# Patient Record
Sex: FEMALE | Race: WHITE | Employment: OTHER | URBAN - METROPOLITAN AREA
[De-identification: names, ages, dates, MRNs, and addresses within clinical notes are randomized per-mention and may not be internally consistent; named-entity substitution may affect disease eponyms.]

---

## 2017-04-28 ENCOUNTER — HOSPITAL ENCOUNTER (OUTPATIENT)
Dept: ULTRASOUND IMAGING | Age: 72
Discharge: HOME | End: 2017-04-28
Attending: SPECIALIST
Payer: MEDICARE

## 2017-04-28 DIAGNOSIS — R22.1 NECK MASS: ICD-10-CM

## 2017-04-28 PROCEDURE — 76536 US EXAM OF HEAD AND NECK: CPT

## 2024-01-08 ENCOUNTER — OFFICE VISIT (OUTPATIENT)
Facility: CLINIC | Age: 79
End: 2024-01-08
Payer: MEDICARE

## 2024-01-08 VITALS
HEIGHT: 66 IN | TEMPERATURE: 98 F | BODY MASS INDEX: 26.36 KG/M2 | HEART RATE: 65 BPM | RESPIRATION RATE: 20 BRPM | OXYGEN SATURATION: 98 % | WEIGHT: 164 LBS | SYSTOLIC BLOOD PRESSURE: 149 MMHG | DIASTOLIC BLOOD PRESSURE: 90 MMHG

## 2024-01-08 DIAGNOSIS — Z13.220 LIPID SCREENING: ICD-10-CM

## 2024-01-08 DIAGNOSIS — R35.0 URINARY FREQUENCY: ICD-10-CM

## 2024-01-08 DIAGNOSIS — Z13.1 DIABETES MELLITUS SCREENING: ICD-10-CM

## 2024-01-08 DIAGNOSIS — I10 PRIMARY HYPERTENSION: ICD-10-CM

## 2024-01-08 DIAGNOSIS — K21.9 GASTROESOPHAGEAL REFLUX DISEASE WITHOUT ESOPHAGITIS: ICD-10-CM

## 2024-01-08 DIAGNOSIS — R79.89 ELEVATED SERUM CREATININE: Primary | ICD-10-CM

## 2024-01-08 DIAGNOSIS — F51.04 PSYCHOPHYSIOLOGICAL INSOMNIA: ICD-10-CM

## 2024-01-08 PROCEDURE — 1123F ACP DISCUSS/DSCN MKR DOCD: CPT | Performed by: FAMILY MEDICINE

## 2024-01-08 PROCEDURE — 3080F DIAST BP >= 90 MM HG: CPT | Performed by: FAMILY MEDICINE

## 2024-01-08 PROCEDURE — 99204 OFFICE O/P NEW MOD 45 MIN: CPT | Performed by: FAMILY MEDICINE

## 2024-01-08 PROCEDURE — 3077F SYST BP >= 140 MM HG: CPT | Performed by: FAMILY MEDICINE

## 2024-01-08 RX ORDER — OMEPRAZOLE 20 MG/1
20 CAPSULE, DELAYED RELEASE ORAL DAILY
COMMUNITY
Start: 2023-10-31

## 2024-01-08 RX ORDER — MAGNESIUM OXIDE TAB 400 MG (241.3 MG ELEMENTAL MG) 400 (241.3 MG) MG
800 TAB ORAL DAILY
COMMUNITY
Start: 2024-01-01

## 2024-01-08 RX ORDER — ALPRAZOLAM 0.5 MG/1
0.25 TABLET ORAL NIGHTLY PRN
COMMUNITY
Start: 2023-12-12

## 2024-01-08 RX ORDER — METOPROLOL SUCCINATE 50 MG/1
50 TABLET, EXTENDED RELEASE ORAL DAILY
COMMUNITY
Start: 2023-12-15

## 2024-01-08 NOTE — PROGRESS NOTES
Family Medicine Initial Office Visit  Patient: Isabel Foote  1945, 78 y.o., female  Encounter Date: 1/8/2024    ASSESSMENT & PLAN  Isabel Foote is a 78 y.o. female who presented for established care with below concerns:    1. Elevated serum creatinine  Assessment & Plan:   Unclear control, continue current plan pending work up below. Told to be improving, continue hydration.  Orders:  -     CBC; Future  -     Comprehensive Metabolic Panel; Future  -     Urinalysis with Reflex to Culture; Future  -     Magnesium; Future  2. Urinary frequency  Assessment & Plan:  Ddx: overactive bladder/postmenopausal, stress incontinence.   3. Primary hypertension  Assessment & Plan:   Uncontrolled, continue current medications and work on lifestyle changes.  Orders:  -     CBC; Future  -     Comprehensive Metabolic Panel; Future  -     Thyroid Cascade Profile; Future  4. Gastroesophageal reflux disease without esophagitis  Assessment & Plan:  Well-controlled, continue current medications. Work on weaning as tolerated, fiber supplementation.  5. Psychophysiological insomnia  Assessment & Plan:  Controlled, recommend trialing Colette's wort and or Nick extra strength sleep gummies.  6. Lipid screening  -     Lipid Panel; Future  7. Diabetes mellitus screening  -     Hemoglobin A1C; Future        No follow-ups on file.    Patient Instructions   Colette's wort and or Nick extra strength sleep gummies.    CHIEF COMPLAINT  Chief Complaint   Patient presents with    New Patient     New pt to practice.  Having aches and pains from getting older.  Bilat chronic knee pain.  RLS  Polyuria.         SUBJECTIVE  Isabel Foote is a 78 y.o. female presenting today for establishing care.    .    Social History     Tobacco Use   Smoking Status Never   Smokeless Tobacco Never     Social History     Substance and Sexual Activity   Alcohol Use Yes    Comment: 2     Social History     Substance and Sexual Activity   Drug Use

## 2024-01-08 NOTE — PROGRESS NOTES
Chief Complaint   Patient presents with    New Patient     New pt to practice.  Having aches and pains from getting older.  Bilat chronic knee pain.  RLS  Polyuria.

## 2024-01-08 NOTE — ASSESSMENT & PLAN NOTE
Unclear control, continue current plan pending work up below. Told to be improving, continue hydration.

## 2024-02-22 ENCOUNTER — TELEPHONE (OUTPATIENT)
Facility: CLINIC | Age: 79
End: 2024-02-22

## 2024-02-22 DIAGNOSIS — F51.04 PSYCHOPHYSIOLOGICAL INSOMNIA: Primary | ICD-10-CM

## 2024-02-22 RX ORDER — ALPRAZOLAM 0.5 MG/1
0.25 TABLET ORAL NIGHTLY PRN
Qty: 45 TABLET | Refills: 0 | Status: SHIPPED | OUTPATIENT
Start: 2024-02-22 | End: 2024-05-22

## 2024-02-22 NOTE — TELEPHONE ENCOUNTER
Pt requesting refill for Alprazolam 0.5 mg tablets to be sent to [facility] at [facility].    Pt had refill from previous PCP, but pharmacy says new script must come from her new PCP. Mckenna

## 2024-04-17 DIAGNOSIS — F51.04 PSYCHOPHYSIOLOGICAL INSOMNIA: ICD-10-CM

## 2024-04-17 NOTE — TELEPHONE ENCOUNTER
Pt called to check on status of refill requested by [facility] pharmacy 2 days ago for Alprazolam stating she has only 1 pill left, takes 1/2 to 1 pill every night to help her sleep, but directions on prescription state to take 1/2 tablet.    Pt asking if Dr. Silvestre can change directions on refill to allow up to 1 pill every night stating her former PCP had her taking up to 2 pills if needed.    Refill already pending for current prescription on file.  Please review. Ldm

## 2024-04-17 NOTE — TELEPHONE ENCOUNTER
[facility] pharmacy faxed request #[id number]    ALPRAZOLAM 0.5 MG TAB    QTY 45    Take 1/2 tablet by mouth nightly as needed for sleep and for anxiety up to for 90 days. Do not exceed 1/2 tablet daily

## 2024-04-18 RX ORDER — ALPRAZOLAM 0.5 MG/1
.25-.5 TABLET ORAL NIGHTLY PRN
Qty: 90 TABLET | Refills: 0 | Status: SHIPPED | OUTPATIENT
Start: 2024-04-18 | End: 2024-07-17

## 2024-05-08 ENCOUNTER — TELEPHONE (OUTPATIENT)
Facility: CLINIC | Age: 79
End: 2024-05-08

## 2024-05-08 NOTE — TELEPHONE ENCOUNTER
Pt called to schedule appointment for ankle swelling with pain and discoloration, noting no relief from icing it, elevating and taking ibuprofen.  Pt has also tried wrapping ankle but is still having pain and has history of injury to shin on this side.    No appointments available in office and pt agrees to go to [facility] today for evaluation and possible imaging. Contact information given. Mckenna

## 2024-05-30 RX ORDER — MAGNESIUM OXIDE TAB 400 MG (241.3 MG ELEMENTAL MG) 400 (241.3 MG) MG
800 TAB ORAL DAILY
Qty: 180 TABLET | Refills: 0 | Status: SHIPPED | OUTPATIENT
Start: 2024-05-30

## 2024-07-11 ENCOUNTER — OFFICE VISIT (OUTPATIENT)
Facility: CLINIC | Age: 79
End: 2024-07-11
Payer: MEDICARE

## 2024-07-11 VITALS
BODY MASS INDEX: 26.03 KG/M2 | OXYGEN SATURATION: 98 % | TEMPERATURE: 97.1 F | SYSTOLIC BLOOD PRESSURE: 143 MMHG | DIASTOLIC BLOOD PRESSURE: 90 MMHG | WEIGHT: 162 LBS | HEART RATE: 73 BPM | RESPIRATION RATE: 20 BRPM | HEIGHT: 66 IN

## 2024-07-11 DIAGNOSIS — R79.89 ELEVATED SERUM CREATININE: ICD-10-CM

## 2024-07-11 DIAGNOSIS — Z13.1 DIABETES MELLITUS SCREENING: ICD-10-CM

## 2024-07-11 DIAGNOSIS — Z13.220 LIPID SCREENING: ICD-10-CM

## 2024-07-11 DIAGNOSIS — I10 PRIMARY HYPERTENSION: Primary | ICD-10-CM

## 2024-07-11 DIAGNOSIS — N39.3 STRESS INCONTINENCE, FEMALE: ICD-10-CM

## 2024-07-11 DIAGNOSIS — M25.661 KNEE JOINT STIFFNESS, BILATERAL: ICD-10-CM

## 2024-07-11 DIAGNOSIS — M25.662 KNEE JOINT STIFFNESS, BILATERAL: ICD-10-CM

## 2024-07-11 DIAGNOSIS — E78.00 ELEVATED CHOLESTEROL: ICD-10-CM

## 2024-07-11 PROBLEM — S52.123A CLOSED FRACTURE OF HEAD OF RADIUS: Status: RESOLVED | Noted: 2024-07-11 | Resolved: 2024-07-11

## 2024-07-11 PROBLEM — M19.071 PRIMARY OSTEOARTHRITIS OF RIGHT ANKLE: Status: RESOLVED | Noted: 2024-05-08 | Resolved: 2024-07-11

## 2024-07-11 PROCEDURE — 1123F ACP DISCUSS/DSCN MKR DOCD: CPT | Performed by: FAMILY MEDICINE

## 2024-07-11 PROCEDURE — 3077F SYST BP >= 140 MM HG: CPT | Performed by: FAMILY MEDICINE

## 2024-07-11 PROCEDURE — 3080F DIAST BP >= 90 MM HG: CPT | Performed by: FAMILY MEDICINE

## 2024-07-11 PROCEDURE — 99214 OFFICE O/P EST MOD 30 MIN: CPT | Performed by: FAMILY MEDICINE

## 2024-07-11 RX ORDER — MAGNESIUM OXIDE TAB 400 MG (241.3 MG ELEMENTAL MG) 400 (241.3 MG) MG
400 TAB ORAL DAILY
Qty: 90 TABLET | Refills: 3 | Status: SHIPPED
Start: 2024-09-11

## 2024-07-11 RX ORDER — METOPROLOL SUCCINATE 50 MG/1
50 TABLET, EXTENDED RELEASE ORAL DAILY
Qty: 90 TABLET | Refills: 2 | Status: CANCELLED | OUTPATIENT
Start: 2024-07-11

## 2024-07-11 SDOH — ECONOMIC STABILITY: INCOME INSECURITY: HOW HARD IS IT FOR YOU TO PAY FOR THE VERY BASICS LIKE FOOD, HOUSING, MEDICAL CARE, AND HEATING?: NOT HARD AT ALL

## 2024-07-11 SDOH — ECONOMIC STABILITY: HOUSING INSECURITY
IN THE LAST 12 MONTHS, WAS THERE A TIME WHEN YOU DID NOT HAVE A STEADY PLACE TO SLEEP OR SLEPT IN A SHELTER (INCLUDING NOW)?: NO

## 2024-07-11 SDOH — ECONOMIC STABILITY: FOOD INSECURITY: WITHIN THE PAST 12 MONTHS, YOU WORRIED THAT YOUR FOOD WOULD RUN OUT BEFORE YOU GOT MONEY TO BUY MORE.: NEVER TRUE

## 2024-07-11 SDOH — ECONOMIC STABILITY: FOOD INSECURITY: WITHIN THE PAST 12 MONTHS, THE FOOD YOU BOUGHT JUST DIDN'T LAST AND YOU DIDN'T HAVE MONEY TO GET MORE.: NEVER TRUE

## 2024-07-11 ASSESSMENT — PATIENT HEALTH QUESTIONNAIRE - PHQ9
SUM OF ALL RESPONSES TO PHQ QUESTIONS 1-9: 0
1. LITTLE INTEREST OR PLEASURE IN DOING THINGS: NOT AT ALL
SUM OF ALL RESPONSES TO PHQ9 QUESTIONS 1 & 2: 0
2. FEELING DOWN, DEPRESSED OR HOPELESS: NOT AT ALL
SUM OF ALL RESPONSES TO PHQ QUESTIONS 1-9: 0

## 2024-07-11 NOTE — ASSESSMENT & PLAN NOTE
Unclear control, changes made today: stay off metoprolol since BP doing well, borderline, and work on lifestyle and weight loss. See back in 4 months. Get labs.

## 2024-07-11 NOTE — PROGRESS NOTES
Chief Complaint   Patient presents with    Follow-up     6 mo fu.  Did not have labs drawn prior to visit.  Would like to discuss being overweight, tired, and achey stiff legs upon rising from sitting.      Other     Bladder issues have gotten better recently.  Still has accident swhen she has a coughing spell.  Pt happy to be sleeping through the night

## 2024-07-11 NOTE — PROGRESS NOTES
SUBJECTIVES  with respective ASSESSMENT/PLAN:  Ms. Isabel Foote is a 79 y.o. female established patient who presents for Follow-up (6 mo fu.  Did not have labs drawn prior to visit.  Would like to discuss being overweight, tired, and achey stiff legs upon rising from sitting.  ) and Other (Bladder issues have gotten better recently.  Still has accident swhen she has a coughing spell.  Pt happy to be sleeping through the night /)  , found to have the followin. 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    BP Readings from Last 3 Encounters:   24 (!) 143/90   24 (!) 149/90     Medication: metoprolol succinate ER 50mg daily (hasn't taken for 1 week due to running out).    Interval Hx:   -  mood still an issue with mood, so affecting patient's stress.  Assessment & Plan:   Unclear control, changes made today: stay off metoprolol since BP doing well, borderline, and work on lifestyle and weight loss. See back in 4 months. Get labs.   Orders:  -     CBC; Future  -     Comprehensive Metabolic Panel; Future  -     Hemoglobin A1C; Future  -     Thyroid Cascade Profile; Future  -     Urinalysis with Reflex to Culture; Future  -     Magnesium; Future  -     CBC; Future  -     Comprehensive Metabolic Panel; Future  2. Stress incontinence, female  Overview:  Since she was in her 50s, after menopause. Gets up 3 times per night to urinate, and also goes frequently during the daytime once every 2 hours, more likely to have to go if sitting more regularly, especially if going horse riding (though urinary control is better). Coughing/sneezing/bouncing can lead to urgency/incontinence.    Interval Hx:  - doing better, drinking less bladder irritants.  - no further concerns at this time.  Assessment & Plan:   Borderline controlled, continue current treatment plan.  3. Knee joint stiffness, bilateral  Overview:  Background: chronic, seems

## 2024-07-11 NOTE — ASSESSMENT & PLAN NOTE
Seems to be mild osteoarthritis given hx wear and tear, no signs of rheumatologic arthritis today. Offered but declined xrays. Will work on conservative cares. See back in 4 months. Consider xrays and PT.

## 2024-07-15 DIAGNOSIS — I10 PRIMARY HYPERTENSION: ICD-10-CM

## 2024-07-15 DIAGNOSIS — Z13.220 LIPID SCREENING: ICD-10-CM

## 2024-07-15 DIAGNOSIS — Z13.1 DIABETES MELLITUS SCREENING: ICD-10-CM

## 2024-07-15 DIAGNOSIS — R79.89 ELEVATED SERUM CREATININE: ICD-10-CM

## 2024-07-15 LAB
ALBUMIN SERPL-MCNC: 3.7 G/DL (ref 3.5–5)
ALBUMIN/GLOB SERPL: 1.2 (ref 1.1–2.2)
ALP SERPL-CCNC: 78 U/L (ref 45–117)
ALT SERPL-CCNC: 28 U/L (ref 12–78)
ANION GAP SERPL CALC-SCNC: 5 MMOL/L (ref 5–15)
APPEARANCE UR: CLEAR
AST SERPL-CCNC: 22 U/L (ref 15–37)
BACTERIA URNS QL MICRO: NEGATIVE /HPF
BILIRUB SERPL-MCNC: 0.8 MG/DL (ref 0.2–1)
BILIRUB UR QL: NEGATIVE
BUN SERPL-MCNC: 18 MG/DL (ref 6–20)
BUN/CREAT SERPL: 22 (ref 12–20)
CALCIUM SERPL-MCNC: 9.1 MG/DL (ref 8.5–10.1)
CHLORIDE SERPL-SCNC: 108 MMOL/L (ref 97–108)
CHOLEST SERPL-MCNC: 247 MG/DL
CO2 SERPL-SCNC: 28 MMOL/L (ref 21–32)
COLOR UR: NORMAL
CREAT SERPL-MCNC: 0.82 MG/DL (ref 0.55–1.02)
EPITH CASTS URNS QL MICRO: NORMAL /LPF
ERYTHROCYTE [DISTWIDTH] IN BLOOD BY AUTOMATED COUNT: 12.7 % (ref 11.5–14.5)
EST. AVERAGE GLUCOSE BLD GHB EST-MCNC: 103 MG/DL
GLOBULIN SER CALC-MCNC: 3 G/DL (ref 2–4)
GLUCOSE SERPL-MCNC: 87 MG/DL (ref 65–100)
GLUCOSE UR STRIP.AUTO-MCNC: NEGATIVE MG/DL
HBA1C MFR BLD: 5.2 % (ref 4–5.6)
HCT VFR BLD AUTO: 42.6 % (ref 35–47)
HDLC SERPL-MCNC: 54 MG/DL
HDLC SERPL: 4.6 (ref 0–5)
HGB BLD-MCNC: 13.5 G/DL (ref 11.5–16)
HGB UR QL STRIP: NEGATIVE
HYALINE CASTS URNS QL MICRO: NORMAL /LPF (ref 0–5)
KETONES UR QL STRIP.AUTO: NEGATIVE MG/DL
LDLC SERPL CALC-MCNC: 168.6 MG/DL (ref 0–100)
LEUKOCYTE ESTERASE UR QL STRIP.AUTO: NEGATIVE
MAGNESIUM SERPL-MCNC: 2.2 MG/DL (ref 1.6–2.4)
MCH RBC QN AUTO: 29.8 PG (ref 26–34)
MCHC RBC AUTO-ENTMCNC: 31.7 G/DL (ref 30–36.5)
MCV RBC AUTO: 94 FL (ref 80–99)
NITRITE UR QL STRIP.AUTO: NEGATIVE
NRBC # BLD: 0 K/UL (ref 0–0.01)
NRBC BLD-RTO: 0 PER 100 WBC
PH UR STRIP: 7.5 (ref 5–8)
PLATELET # BLD AUTO: 197 K/UL (ref 150–400)
PMV BLD AUTO: 10.5 FL (ref 8.9–12.9)
POTASSIUM SERPL-SCNC: 4.2 MMOL/L (ref 3.5–5.1)
PROT SERPL-MCNC: 6.7 G/DL (ref 6.4–8.2)
PROT UR STRIP-MCNC: NEGATIVE MG/DL
RBC # BLD AUTO: 4.53 M/UL (ref 3.8–5.2)
RBC #/AREA URNS HPF: NORMAL /HPF (ref 0–5)
SODIUM SERPL-SCNC: 141 MMOL/L (ref 136–145)
SP GR UR REFRACTOMETRY: 1.01 (ref 1–1.03)
TRIGL SERPL-MCNC: 122 MG/DL
URINE CULTURE IF INDICATED: NORMAL
UROBILINOGEN UR QL STRIP.AUTO: 0.2 EU/DL (ref 0.2–1)
VLDLC SERPL CALC-MCNC: 24.4 MG/DL
WBC # BLD AUTO: 5 K/UL (ref 3.6–11)
WBC URNS QL MICRO: NORMAL /HPF (ref 0–4)

## 2024-07-17 LAB — TSH SERPL DL<=0.05 MIU/L-ACNC: 2.52 UIU/ML (ref 0.45–4.5)

## 2024-07-18 NOTE — RESULT ENCOUNTER NOTE
Ms. Foote, I am assisting in covering Dr. Silvestre's patients today. Your cholesterol is quite high! Do you know if previous readings have been similar? Your liver and kidneys look normal. Your thyroid is normal. Your blood sugar is normal (no diabetes or prediabetes). You have no signs of anemia. Please schedule an appt to discuss your cholesterol sooner and review your blood pressure. Many thanks, Linda Anders [facility]

## 2024-08-08 DIAGNOSIS — F51.04 PSYCHOPHYSIOLOGICAL INSOMNIA: ICD-10-CM

## 2024-08-09 RX ORDER — ALPRAZOLAM 0.5 MG/1
TABLET ORAL
Qty: 90 TABLET | Refills: 0 | Status: SHIPPED | OUTPATIENT
Start: 2024-08-09 | End: 2024-11-07

## 2024-08-19 ENCOUNTER — TELEPHONE (OUTPATIENT)
Facility: CLINIC | Age: 79
End: 2024-08-19

## 2024-08-19 DIAGNOSIS — D22.9 CHANGE IN MOLE: Primary | ICD-10-CM

## 2024-08-19 NOTE — TELEPHONE ENCOUNTER
Pt will be calling her dermatologist to make an appointment for mole check/removal. She is asking if she needs a referral from Dr Silvestre in order to have them removed.  Dr Cleo Painter / Meredith Marquez   [facility]  She would like a call back  [phone]  .

## 2024-08-21 NOTE — TELEPHONE ENCOUNTER
Provider referral has been faxed.     Called and spoke with pt, and she has been advised and states understanding of this.

## 2024-09-12 RX ORDER — MAGNESIUM OXIDE TAB 400 MG (241.3 MG ELEMENTAL MG) 400 (241.3 MG) MG
400 TAB ORAL DAILY
Qty: 90 TABLET | Refills: 3 | Status: SHIPPED | OUTPATIENT
Start: 2024-09-12

## 2024-10-02 DIAGNOSIS — Z11.59 NEED FOR HEPATITIS C SCREENING TEST: ICD-10-CM

## 2024-10-02 DIAGNOSIS — Z78.0 POST-MENOPAUSAL: ICD-10-CM

## 2024-10-02 NOTE — PROGRESS NOTES
Unable to reach patient via telephone to advise of labs that to be completed by 10/08/24 so that results can be discussed at appointment on 10/10. Will attempt another call.

## 2024-10-10 ENCOUNTER — OFFICE VISIT (OUTPATIENT)
Facility: CLINIC | Age: 79
End: 2024-10-10
Payer: MEDICARE

## 2024-10-10 VITALS
BODY MASS INDEX: 26.03 KG/M2 | HEART RATE: 81 BPM | DIASTOLIC BLOOD PRESSURE: 89 MMHG | WEIGHT: 162 LBS | TEMPERATURE: 97.2 F | RESPIRATION RATE: 16 BRPM | SYSTOLIC BLOOD PRESSURE: 139 MMHG | OXYGEN SATURATION: 98 % | HEIGHT: 66 IN

## 2024-10-10 DIAGNOSIS — I10 PRIMARY HYPERTENSION: ICD-10-CM

## 2024-10-10 DIAGNOSIS — R25.2 LEG CRAMPS: ICD-10-CM

## 2024-10-10 DIAGNOSIS — Z00.00 MEDICARE ANNUAL WELLNESS VISIT, SUBSEQUENT: Primary | ICD-10-CM

## 2024-10-10 DIAGNOSIS — E78.00 PURE HYPERCHOLESTEROLEMIA: ICD-10-CM

## 2024-10-10 DIAGNOSIS — E55.9 VITAMIN D DEFICIENCY: ICD-10-CM

## 2024-10-10 PROCEDURE — 3079F DIAST BP 80-89 MM HG: CPT | Performed by: FAMILY MEDICINE

## 2024-10-10 PROCEDURE — G0439 PPPS, SUBSEQ VISIT: HCPCS | Performed by: FAMILY MEDICINE

## 2024-10-10 PROCEDURE — 3075F SYST BP GE 130 - 139MM HG: CPT | Performed by: FAMILY MEDICINE

## 2024-10-10 PROCEDURE — 1123F ACP DISCUSS/DSCN MKR DOCD: CPT | Performed by: FAMILY MEDICINE

## 2024-10-10 RX ORDER — MAGNESIUM GLYCINATE 100 MG
400 CAPSULE ORAL DAILY
Qty: 90 CAPSULE | Refills: 3 | Status: SHIPPED | OUTPATIENT
Start: 2024-10-10

## 2024-10-10 ASSESSMENT — LIFESTYLE VARIABLES
HOW OFTEN DO YOU HAVE A DRINK CONTAINING ALCOHOL: MONTHLY OR LESS
HOW MANY STANDARD DRINKS CONTAINING ALCOHOL DO YOU HAVE ON A TYPICAL DAY: 1 OR 2

## 2024-10-10 ASSESSMENT — PATIENT HEALTH QUESTIONNAIRE - PHQ9
SUM OF ALL RESPONSES TO PHQ QUESTIONS 1-9: 0
SUM OF ALL RESPONSES TO PHQ QUESTIONS 1-9: 0
SUM OF ALL RESPONSES TO PHQ9 QUESTIONS 1 & 2: 0
SUM OF ALL RESPONSES TO PHQ QUESTIONS 1-9: 0
2. FEELING DOWN, DEPRESSED OR HOPELESS: NOT AT ALL
1. LITTLE INTEREST OR PLEASURE IN DOING THINGS: NOT AT ALL
SUM OF ALL RESPONSES TO PHQ QUESTIONS 1-9: 0

## 2024-10-10 NOTE — PROGRESS NOTES
Chief Complaint   Patient presents with    Follow-up     4 mo fu is getting over a cold feeling a little better.  Gen health is good.     Hypertension     Not sure how bp is running lately       Discuss Labs     Had labs drawn and would like to discuss.

## 2024-10-10 NOTE — PATIENT INSTRUCTIONS
Keywords and Lifestyle Strategies to consider and look up:    ACTIVITY:  General Movement is important to burn calories, keep your metabolism and mitochondria moving and functioning. Our bodies are built to be moving no less than 10,000 steps per day, and really we should strive for 20,000 steps per day. At the end of the day we should be able to say that we have been moving more than sitting or standing still.    Cardio keeps the heart strong, 30 minutes of moderate activity 5 days per week is the goal.    Strength and muscle building burns fats even on rest/recover days, and can even help with healthy physiologic testosterone production (men and women) to make it easier to regulate your metabolism.    High Intensity Interval Training or H-I-I-T; a method of exercising more intensely with a cominbination of muscle/strength techniques and cardio for 15-20 minutes 3 days per week.    FOODS:  LOW CARBOHYDRATE. HIGH FIBER. HEALTHY FATS. ANTIOXIDANT RICH.    Low carbohydrates:  Carbohydrates are a luxury energy source, quick burning, quick storage, inflammation promoting.    Carbs include simple sugars found in soda pop, sweet tea, juices, and desserts. Other carbohydrate sources that lead to trouble is found in white rice, pasta, noodles, white bread, white potatoes.    But sometimes we should eat some carbs, but they need to be high fiber. In fact, in nature carbohydrates are never found without fiber or protein, but modern food processing has removed those important ingredients,  things that never should have been .     Some examples of healthy grains/carbs include spelt, Khorasan wheat (Kam[place]), einkorn, and emmer; the grains millet, barley, teff, oats, and sorghum; and the pseudocereals quinoa, amaranth, buckwheat, and samm, flaxseed. Sprouted grain breads are good for you as well.    Carbohydrates are also designed for high energy-demanding activities such as running, using our muscles, dancing,

## 2024-10-10 NOTE — PROGRESS NOTES
Medicare Annual Wellness Visit    Isabel Foote is here for Follow-up (4 mo fu is getting over a cold feeling a little better.  Gen health is good. ), Hypertension (Not sure how bp is running lately /), and Discuss Labs (Had labs drawn and would like to discuss. )    Assessment & Plan  1. Hyperlipidemia.  Her LDL cholesterol level is elevated at 168. She has a history of high cholesterol and was previously on simvastatin but discontinued it due to concerns about statin drugs. The potential benefits of a coronary calcium scan were discussed to assess plaque buildup in her arteries. She expressed interest in discussing this further at the next visit. A comprehensive discussion on dietary health was conducted, emphasizing the importance of a Mediterranean diet rich in fiber. Handouts detailing various food categories and their significance were provided. She was advised to continue focusing on a healthy diet and lifestyle changes.    2. Hypertension.  Her blood pressure readings are within the normal range today. She has been off metoprolol and reports feeling more energetic since discontinuing the medication. She was advised to ensure adequate sleep and practice nose and diaphragm breathing to promote nitric oxide production, which can help manage blood pressure.    3. Leg cramps.  She has been taking magnesium oxide for leg cramps, which has significantly reduced the cramping. A prescription for magnesium glycinate 400 mg daily was given to potentially reduce gastrointestinal side effects.    Follow-up  Return for her annual visit.    Medicare annual wellness visit, subsequent  Pure hypercholesterolemia  -     Lipid Panel; Future  -     Thyroid Cascade Profile; Future  Primary hypertension  -     Magnesium Glycinate 100 MG CAPS; Take 400 mg by mouth daily, Disp-90 capsule, R-3Normal  -     CBC; Future  -     Comprehensive Metabolic Panel; Future  -     Thyroid Cascade Profile; Future  -     Microalbumin /

## 2024-10-28 ENCOUNTER — TELEPHONE (OUTPATIENT)
Facility: CLINIC | Age: 79
End: 2024-10-28

## 2024-10-28 NOTE — TELEPHONE ENCOUNTER
----- Message from Lucy PERRY sent at 10/28/2024 12:05 PM EDT -----  Regarding: ECC Escalation To Practice  ECC Escalation To Practice      Type of Escalation: Red Flag Symptom  --------------------------------------------------------------------------------------------------------------------------    Information for Provider: Usama Silvestre MD  Patient is looking for appointment for: Symptom: Chest Pain  Reasons for Message: Unable to reach practice     Additional Information: For 5 days, the patient is having a dry cough that causes chest pain on her left side of her breast.  --------------------------------------------------------------------------------------------------------------------------    Relationship to Patient: Self     Call Back Info: OK to leave message on voicemail  Preferred Call Back Number: Phone  [phone]; [phone]

## 2024-10-30 ENCOUNTER — OFFICE VISIT (OUTPATIENT)
Age: 79
End: 2024-10-30

## 2024-10-30 ENCOUNTER — APPOINTMENT (OUTPATIENT)
Facility: HOSPITAL | Age: 79
End: 2024-10-30
Payer: MEDICARE

## 2024-10-30 ENCOUNTER — HOSPITAL ENCOUNTER (EMERGENCY)
Facility: HOSPITAL | Age: 79
Discharge: HOME | End: 2024-10-30
Attending: EMERGENCY MEDICINE
Payer: MEDICARE

## 2024-10-30 VITALS
WEIGHT: 160 LBS | DIASTOLIC BLOOD PRESSURE: 86 MMHG | HEIGHT: 66 IN | TEMPERATURE: 98.3 F | HEART RATE: 73 BPM | BODY MASS INDEX: 25.71 KG/M2 | OXYGEN SATURATION: 95 % | RESPIRATION RATE: 14 BRPM | SYSTOLIC BLOOD PRESSURE: 132 MMHG

## 2024-10-30 VITALS
WEIGHT: 160 LBS | HEIGHT: 66 IN | DIASTOLIC BLOOD PRESSURE: 98 MMHG | RESPIRATION RATE: 16 BRPM | HEART RATE: 90 BPM | SYSTOLIC BLOOD PRESSURE: 164 MMHG | BODY MASS INDEX: 25.71 KG/M2 | TEMPERATURE: 98 F | OXYGEN SATURATION: 99 %

## 2024-10-30 DIAGNOSIS — R07.9 CHEST PAIN, UNSPECIFIED TYPE: Primary | ICD-10-CM

## 2024-10-30 DIAGNOSIS — R05.1 ACUTE COUGH: Primary | ICD-10-CM

## 2024-10-30 PROCEDURE — 99283 EMERGENCY DEPT VISIT LOW MDM: CPT

## 2024-10-30 PROCEDURE — 71046 X-RAY EXAM CHEST 2 VIEWS: CPT

## 2024-10-30 RX ORDER — ATORVASTATIN CALCIUM 10 MG/1
TABLET, FILM COATED ORAL DAILY
COMMUNITY
End: 2024-10-30

## 2024-10-30 ASSESSMENT — PAIN - FUNCTIONAL ASSESSMENT
PAIN_FUNCTIONAL_ASSESSMENT: NONE - DENIES PAIN
PAIN_FUNCTIONAL_ASSESSMENT: 0-10
PAIN_FUNCTIONAL_ASSESSMENT: ACTIVITIES ARE NOT PREVENTED

## 2024-10-30 ASSESSMENT — PAIN DESCRIPTION - ONSET: ONSET: SUDDEN

## 2024-10-30 ASSESSMENT — PAIN DESCRIPTION - ORIENTATION: ORIENTATION: LEFT

## 2024-10-30 ASSESSMENT — PAIN DESCRIPTION - PAIN TYPE: TYPE: ACUTE PAIN

## 2024-10-30 ASSESSMENT — PAIN DESCRIPTION - LOCATION: LOCATION: CHEST

## 2024-10-30 ASSESSMENT — PAIN SCALES - GENERAL: PAINLEVEL_OUTOF10: 5

## 2024-10-30 ASSESSMENT — PAIN DESCRIPTION - DESCRIPTORS: DESCRIPTORS: ACHING

## 2024-10-30 NOTE — ED PROVIDER NOTES
[facility] EMERGENCY DEPT  EMERGENCY DEPARTMENT ENCOUNTER      Pt Name: Isabel Foote  MRN: [id number]  Birthdate 1945  Date of evaluation: 10/30/2024  Provider: Tatiana Leon DO    CHIEF COMPLAINT       Chief Complaint   Patient presents with    Cough    Chest Pain         HISTORY OF PRESENT ILLNESS   (Location/Symptom, Timing/Onset, Context/Setting, Quality, Duration, Modifying Factors, Severity)  Note limiting factors.   HPI      Review of External Medical Records:     Nursing Notes were reviewed.    REVIEW OF SYSTEMS    (2-9 systems for level 4, 10 or more for level 5)     Review of Systems    Except as noted above the remainder of the review of systems was reviewed and negative.       PAST MEDICAL HISTORY     Past Medical History:   Diagnosis Date    Closed fracture of head of radius 07/11/2024    GERD (gastroesophageal reflux disease)     Hypertension     Primary osteoarthritis of right ankle 05/08/2024         SURGICAL HISTORY       Past Surgical History:   Procedure Laterality Date    TONSILLECTOMY           CURRENT MEDICATIONS       Previous Medications    ALPRAZOLAM (XANAX) 0.5 MG TABLET    TAKE 1/2 TO 1 TABLET BY MOUTH AT NIGHT AS NEEDED FOR SLEEP AND FOR ANXIETY . DO NOT EXCEED 1 TABLET    MAGNESIUM GLYCINATE 100 MG CAPS    Take 400 mg by mouth daily    MAGNESIUM-OXIDE 400 (240 MG) MG TABLET    Take 1 tablet by mouth daily    OMEPRAZOLE (PRILOSEC) 20 MG DELAYED RELEASE CAPSULE    Take 1 capsule by mouth Daily       ALLERGIES     Patient has no known allergies.    FAMILY HISTORY       Family History   Problem Relation Age of Onset    Alcohol Abuse Father     Heart Disease Father           SOCIAL HISTORY       Social History     Socioeconomic History    Marital status:    Tobacco Use    Smoking status: Never    Smokeless tobacco: Never   Vaping Use    Vaping status: Never Used   Substance and Sexual Activity    Alcohol use: Yes     Comment: 2    Drug use: Not Currently    Sexual activity: Not

## 2024-10-30 NOTE — ED TRIAGE NOTES
Pt states her PCP couldn't see her till Friday, and they told her to go to urgent care to check on this cough that she has had for a month. (thinks she had covid on the 3rd of October). She states urgent care brigitte hanna did EKG and sent her here for a chest xray. Denies shortness of breath

## 2024-10-30 NOTE — PROGRESS NOTES
Isabel Foote (:  1945) is a 79 y.o. female,New patient, here for evaluation of the following chief complaint(s):  Cough (Pt c/o persistent dry cough for over a month, soreness in chest and left side of ribs. She's taken robitussin dm with some relief. Had covid at beginning of the month. )        SUBJECTIVE/OBJECTIVE:    History provided by:  Patient       79 y.o. female presents with symptoms of persistent cough and chest pain. Cough started one month ago. When it initially started she was not tested for Covid, was down for 4-5 days, then her  came down with Covid, he tested positive, she was never tested but assumes it was. She recovered, no more nasal congestion or runny nose, but the cough has been lingering. States not much chest congestion. No fevers.    Took some Robitussin DM yesterday and is losening up a little bit, when moving or coughing feel in irritation in her lower chest. Pain is left chest, does not notice it when she is up walking around but notices it if she is lying down. Denies crushing pain. Pain is not sharp or stabbing. Pain with deep inspiration.    No history of heart disease or heart attacks.         Vitals:    10/30/24 1449   BP: 132/86   Site: Right Upper Arm   Position: Sitting   Cuff Size: Medium Adult   Pulse: 73   Resp: 14   Temp: 98.3 °F (36.8 °C)   TempSrc: Oral   SpO2: 95%   Weight: 72.6 kg (160 lb)   Height: 1.676 m (5' 6\")       No results found for this visit on 10/30/24.     Physical Exam  Constitutional:       General: She is not in acute distress.     Appearance: Normal appearance. She is not ill-appearing or toxic-appearing.   HENT:      Head: Normocephalic and atraumatic.      Right Ear: Tympanic membrane, ear canal and external ear normal.      Left Ear: Tympanic membrane, ear canal and external ear normal.      Nose: Nose normal.      Mouth/Throat:      Mouth: Mucous membranes are moist.      Pharynx: No oropharyngeal exudate or posterior

## 2024-12-05 ENCOUNTER — TELEPHONE (OUTPATIENT)
Facility: CLINIC | Age: 79
End: 2024-12-05

## 2024-12-05 NOTE — TELEPHONE ENCOUNTER
Pt requesting call back at [phone] from Dr. Silvestre or his nurse to clarify dosing instructions for prescription given for Magnesium Glycinate 100 mg capsules written to take 400 mg daily, stating she used to take 2 tablets daily, but doesn't want to OD on magnesium.      Please advise. Mckenna

## 2024-12-17 DIAGNOSIS — I10 PRIMARY HYPERTENSION: ICD-10-CM

## 2024-12-17 DIAGNOSIS — R25.2 LEG CRAMPS: ICD-10-CM

## 2024-12-17 RX ORDER — MAGNESIUM OXIDE TAB 400 MG (241.3 MG ELEMENTAL MG) 400 (241.3 MG) MG
400 TAB ORAL DAILY
Qty: 90 TABLET | Refills: 3 | Status: SHIPPED | OUTPATIENT
Start: 2024-12-17

## 2024-12-17 NOTE — TELEPHONE ENCOUNTER
Refill request for:    Magnesium oxide 400 (240 mg) tablet      [facility] at [facility]

## 2025-01-17 DIAGNOSIS — F51.04 PSYCHOPHYSIOLOGICAL INSOMNIA: ICD-10-CM

## 2025-01-17 RX ORDER — ALPRAZOLAM 0.5 MG
0.5 TABLET ORAL NIGHTLY PRN
Qty: 90 TABLET | Refills: 0 | Status: SHIPPED | OUTPATIENT
Start: 2025-01-17 | End: 2025-04-17

## 2025-01-17 NOTE — TELEPHONE ENCOUNTER
Pt is requesting a refill be sent to [facility] at [facility]    ALPRAZOLAM 0.5 MG

## 2025-04-09 DIAGNOSIS — F51.04 PSYCHOPHYSIOLOGICAL INSOMNIA: ICD-10-CM

## 2025-04-10 RX ORDER — ALPRAZOLAM 0.5 MG
0.5 TABLET ORAL NIGHTLY PRN
Qty: 90 TABLET | Refills: 0 | Status: SHIPPED | OUTPATIENT
Start: 2025-04-10 | End: 2025-07-09

## 2025-05-15 ENCOUNTER — TELEPHONE (OUTPATIENT)
Age: 80
End: 2025-05-15

## 2025-05-15 ENCOUNTER — OFFICE VISIT (OUTPATIENT)
Facility: CLINIC | Age: 80
End: 2025-05-15
Payer: MEDICARE

## 2025-05-15 ENCOUNTER — HOSPITAL ENCOUNTER (OUTPATIENT)
Facility: HOSPITAL | Age: 80
Discharge: HOME | End: 2025-05-18

## 2025-05-15 VITALS
TEMPERATURE: 98 F | RESPIRATION RATE: 16 BRPM | HEART RATE: 78 BPM | SYSTOLIC BLOOD PRESSURE: 121 MMHG | DIASTOLIC BLOOD PRESSURE: 87 MMHG | HEIGHT: 66 IN | BODY MASS INDEX: 25.82 KG/M2 | OXYGEN SATURATION: 97 %

## 2025-05-15 DIAGNOSIS — I49.9 IRREGULAR HEART BEAT: ICD-10-CM

## 2025-05-15 DIAGNOSIS — R79.89 ELEVATED SERUM CREATININE: ICD-10-CM

## 2025-05-15 DIAGNOSIS — I49.9 IRREGULAR HEART BEAT: Primary | ICD-10-CM

## 2025-05-15 DIAGNOSIS — R06.02 SHORTNESS OF BREATH: ICD-10-CM

## 2025-05-15 DIAGNOSIS — R94.31 ABNORMAL EKG: ICD-10-CM

## 2025-05-15 DIAGNOSIS — R94.31 ABNORMAL ELECTROCARDIOGRAPHY: ICD-10-CM

## 2025-05-15 DIAGNOSIS — I44.7 LBBB (LEFT BUNDLE BRANCH BLOCK): ICD-10-CM

## 2025-05-15 PROCEDURE — 1159F MED LIST DOCD IN RCRD: CPT | Performed by: FAMILY MEDICINE

## 2025-05-15 PROCEDURE — 1123F ACP DISCUSS/DSCN MKR DOCD: CPT | Performed by: FAMILY MEDICINE

## 2025-05-15 PROCEDURE — 1036F TOBACCO NON-USER: CPT | Performed by: FAMILY MEDICINE

## 2025-05-15 PROCEDURE — G8400 PT W/DXA NO RESULTS DOC: HCPCS | Performed by: FAMILY MEDICINE

## 2025-05-15 PROCEDURE — 99214 OFFICE O/P EST MOD 30 MIN: CPT | Performed by: FAMILY MEDICINE

## 2025-05-15 PROCEDURE — 93000 ELECTROCARDIOGRAM COMPLETE: CPT | Performed by: FAMILY MEDICINE

## 2025-05-15 PROCEDURE — 3079F DIAST BP 80-89 MM HG: CPT | Performed by: FAMILY MEDICINE

## 2025-05-15 PROCEDURE — G8419 CALC BMI OUT NRM PARAM NOF/U: HCPCS | Performed by: FAMILY MEDICINE

## 2025-05-15 PROCEDURE — 1090F PRES/ABSN URINE INCON ASSESS: CPT | Performed by: FAMILY MEDICINE

## 2025-05-15 PROCEDURE — G8427 DOCREV CUR MEDS BY ELIG CLIN: HCPCS | Performed by: FAMILY MEDICINE

## 2025-05-15 PROCEDURE — 3074F SYST BP LT 130 MM HG: CPT | Performed by: FAMILY MEDICINE

## 2025-05-15 SDOH — ECONOMIC STABILITY: FOOD INSECURITY: WITHIN THE PAST 12 MONTHS, THE FOOD YOU BOUGHT JUST DIDN'T LAST AND YOU DIDN'T HAVE MONEY TO GET MORE.: NEVER TRUE

## 2025-05-15 SDOH — ECONOMIC STABILITY: FOOD INSECURITY: WITHIN THE PAST 12 MONTHS, YOU WORRIED THAT YOUR FOOD WOULD RUN OUT BEFORE YOU GOT MONEY TO BUY MORE.: NEVER TRUE

## 2025-05-15 ASSESSMENT — PATIENT HEALTH QUESTIONNAIRE - PHQ9
SUM OF ALL RESPONSES TO PHQ QUESTIONS 1-9: 0
SUM OF ALL RESPONSES TO PHQ QUESTIONS 1-9: 0
1. LITTLE INTEREST OR PLEASURE IN DOING THINGS: NOT AT ALL
SUM OF ALL RESPONSES TO PHQ QUESTIONS 1-9: 0
SUM OF ALL RESPONSES TO PHQ QUESTIONS 1-9: 0
2. FEELING DOWN, DEPRESSED OR HOPELESS: NOT AT ALL

## 2025-05-15 NOTE — PROGRESS NOTES
Chief Complaint   Patient presents with    Irregular Heart Beat    Fatigue       /87   Pulse 78   Temp 98 °F (36.7 °C)   Resp 16   Ht 1.676 m (5' 6\")   SpO2 97%   BMI 25.82 kg/m²   Have you been to the ER, urgent care clinic since your last visit?  Hospitalized since your last visit?   YES    Have you seen or consulted any other health care providers outside our system since your last visit?   NO

## 2025-05-15 NOTE — PROGRESS NOTES
Assessment & Plan  1. Irregular heartbeat: Chronic. EKG shows left bundle branch block with left atrial enlargement, consistent with findings from 10/2024.  - Order stress echocardiogram to evaluate heart's structure and function during rest and activity.  - Refer to Cardiac Electrophysiology for further evaluation.    2. Fatigue: Persistent.  - Order blood work to check thyroid function, blood counts, and electrolytes to rule out underlying conditions.    3. Shortness of breath: Chronic. Since 10/2024, worsens with exertion, improves with movement.  - Order chest x-ray to rule out lung-related issues.  - Monitor symptoms and consider further evaluation based on initial test results.    4. Numbness in feet: Acute.  - Monitor and consider further evaluation based on initial test results.  - Magnesium supplementation beneficial for leg cramps.    Follow-up  - Schedule stress echocardiogram.  - Refer to Cardiac Electrophysiology.  - Order chest x-ray.  - Order blood work.    It was a pleasure seeing Ms. Isabel Foote today.  No follow-ups on file.    History of Present Illness  The patient, a 79-year-old female, presents with new-onset fatigue and arrhythmia.    Dyspnea  - Experiencing dyspnea since October, initially disregarded but now causing significant discomfort  - Weakness in her legs after standing for approximately five minutes, necessitating periods of sitting  - Dyspnea exacerbated by physical activities such as cutting grass or horseback riding, with symptomatic relief upon rest  - Severe dyspnea and chest pressure during a wedding event on 10/19/2024, which resolved spontaneously  - Denies episodes of lightheadedness or near-syncope  - Several episodes of coughing causing pain in her lateral thoracic region  - Suspects a COVID-19 infection contracted from her  in October but did not seek medical attention or testing    Palpitations  - Sensation of palpitations, palpable even at rest, present for

## 2025-05-15 NOTE — TELEPHONE ENCOUNTER
Patient has an appointment for 7/30/25 to see the doctor.Patient needs something sooner because she is being seen for LBBB and she was referred by PCP.Please assist.    [phone] cell patient

## 2025-05-16 ENCOUNTER — TELEPHONE (OUTPATIENT)
Facility: CLINIC | Age: 80
End: 2025-05-16

## 2025-05-16 LAB
ALBUMIN SERPL-MCNC: 3.8 G/DL (ref 3.5–5)
ALBUMIN/GLOB SERPL: 1.3 (ref 1.1–2.2)
ALP SERPL-CCNC: 86 U/L (ref 45–117)
ALT SERPL-CCNC: 35 U/L (ref 12–78)
ANION GAP SERPL CALC-SCNC: 5 MMOL/L (ref 2–12)
AST SERPL-CCNC: 26 U/L (ref 15–37)
BASOPHILS # BLD: 0.03 K/UL (ref 0–0.1)
BASOPHILS NFR BLD: 0.4 % (ref 0–1)
BILIRUB SERPL-MCNC: 0.4 MG/DL (ref 0.2–1)
BUN SERPL-MCNC: 25 MG/DL (ref 6–20)
BUN/CREAT SERPL: 27 (ref 12–20)
CALCIUM SERPL-MCNC: 9 MG/DL (ref 8.5–10.1)
CHLORIDE SERPL-SCNC: 107 MMOL/L (ref 97–108)
CO2 SERPL-SCNC: 26 MMOL/L (ref 21–32)
CREAT SERPL-MCNC: 0.92 MG/DL (ref 0.55–1.02)
DIFFERENTIAL METHOD BLD: NORMAL
EOSINOPHIL # BLD: 0.25 K/UL (ref 0–0.4)
EOSINOPHIL NFR BLD: 3.6 % (ref 0–7)
ERYTHROCYTE [DISTWIDTH] IN BLOOD BY AUTOMATED COUNT: 12.5 % (ref 11.5–14.5)
EST. AVERAGE GLUCOSE BLD GHB EST-MCNC: 105 MG/DL
GLOBULIN SER CALC-MCNC: 2.9 G/DL (ref 2–4)
GLUCOSE SERPL-MCNC: 98 MG/DL (ref 65–100)
HBA1C MFR BLD: 5.3 % (ref 4–5.6)
HCT VFR BLD AUTO: 43.6 % (ref 35–47)
HGB BLD-MCNC: 14.2 G/DL (ref 11.5–16)
IMM GRANULOCYTES # BLD AUTO: 0.01 K/UL (ref 0–0.04)
IMM GRANULOCYTES NFR BLD AUTO: 0.1 % (ref 0–0.5)
LYMPHOCYTES # BLD: 2.09 K/UL (ref 0.8–3.5)
LYMPHOCYTES NFR BLD: 30.3 % (ref 12–49)
MCH RBC QN AUTO: 30.1 PG (ref 26–34)
MCHC RBC AUTO-ENTMCNC: 32.6 G/DL (ref 30–36.5)
MCV RBC AUTO: 92.4 FL (ref 80–99)
MONOCYTES # BLD: 0.5 K/UL (ref 0–1)
MONOCYTES NFR BLD: 7.2 % (ref 5–13)
NEUTS SEG # BLD: 4.02 K/UL (ref 1.8–8)
NEUTS SEG NFR BLD: 58.4 % (ref 32–75)
NRBC # BLD: 0 K/UL (ref 0–0.01)
NRBC BLD-RTO: 0 PER 100 WBC
PLATELET # BLD AUTO: 202 K/UL (ref 150–400)
PMV BLD AUTO: 11 FL (ref 8.9–12.9)
POTASSIUM SERPL-SCNC: 4.4 MMOL/L (ref 3.5–5.1)
PROT SERPL-MCNC: 6.7 G/DL (ref 6.4–8.2)
RBC # BLD AUTO: 4.72 M/UL (ref 3.8–5.2)
SODIUM SERPL-SCNC: 138 MMOL/L (ref 136–145)
WBC # BLD AUTO: 6.9 K/UL (ref 3.6–11)

## 2025-05-16 NOTE — TELEPHONE ENCOUNTER
Patient advised we would recommend awaiting Echo results to see what direction we should go.     Based on results she may need to see Gen Cards versus EP

## 2025-05-16 NOTE — TELEPHONE ENCOUNTER
Patient was given a referral for Cardiology and was advised that they do not have an appointment until July. She got on the cancellation list but was seeing if another referral could be put in.  I suggested her call other offices on her own but wanted me to send message.

## 2025-05-17 LAB — TSH SERPL DL<=0.05 MIU/L-ACNC: 1.81 UIU/ML (ref 0.45–4.5)

## 2025-05-19 ENCOUNTER — HOSPITAL ENCOUNTER (OUTPATIENT)
Facility: HOSPITAL | Age: 80
Discharge: HOME | End: 2025-05-22
Payer: MEDICARE

## 2025-05-19 DIAGNOSIS — R06.02 SHORTNESS OF BREATH: ICD-10-CM

## 2025-05-19 PROCEDURE — 71046 X-RAY EXAM CHEST 2 VIEWS: CPT

## 2025-05-20 ENCOUNTER — RESULTS FOLLOW-UP (OUTPATIENT)
Facility: CLINIC | Age: 80
End: 2025-05-20

## 2025-05-20 ENCOUNTER — TELEPHONE (OUTPATIENT)
Facility: CLINIC | Age: 80
End: 2025-05-20

## 2025-05-20 NOTE — TELEPHONE ENCOUNTER
Pt called office, seeing cardiology, on 07/31/2025, provider referral in EPIC.   Pt asks for insurance referral if needed.     Catrina Chacon MD  Cardiovascular Disease

## 2025-05-22 ENCOUNTER — TELEPHONE (OUTPATIENT)
Facility: CLINIC | Age: 80
End: 2025-05-22

## 2025-05-22 DIAGNOSIS — R06.02 SHORTNESS OF BREATH: ICD-10-CM

## 2025-05-22 DIAGNOSIS — R94.31 ABNORMAL ELECTROCARDIOGRAPHY: Primary | ICD-10-CM

## 2025-05-22 NOTE — TELEPHONE ENCOUNTER
Scheduling called office in regards pt's stress test, scheduled for tomorrow.     Pt has a Left Bundle Branch block and when pt have this, correct order needed is:     Nuclear cardiac stress test using lexiscan ( stressing agent )     Correct order pending.

## 2025-05-29 ENCOUNTER — TELEPHONE (OUTPATIENT)
Facility: CLINIC | Age: 80
End: 2025-05-29

## 2025-05-29 NOTE — TELEPHONE ENCOUNTER
----- Message from LOGAN JENKINS MA sent at 5/29/2025  9:06 AM EDT -----  Regarding: FW: ECC Message to Provider    ----- Message -----  From: Cynthia Martinez  Sent: 5/28/2025   1:00 PM EDT  To: #  Subject: ECC Message to Provider                          ECC Message to Provider    Relationship to Patient: Self     Additional Information: patient was supposed to have an appointment last 5/23/2025 however that was canceled an haven't heard back from the practice.   --------------------------------------------------------------------------------------------------------------------------    Call Back Information: no voicemail/ text message/ [employer]  Preferred Call Back Number: Phone [phone]

## 2025-06-18 ENCOUNTER — HOSPITAL ENCOUNTER (OUTPATIENT)
Facility: HOSPITAL | Age: 80
Discharge: HOME | End: 2025-06-20
Payer: MEDICARE

## 2025-06-18 ENCOUNTER — HOSPITAL ENCOUNTER (OUTPATIENT)
Facility: HOSPITAL | Age: 80
Discharge: HOME | End: 2025-06-21
Payer: MEDICARE

## 2025-06-18 VITALS
SYSTOLIC BLOOD PRESSURE: 131 MMHG | WEIGHT: 160 LBS | DIASTOLIC BLOOD PRESSURE: 77 MMHG | HEIGHT: 66 IN | BODY MASS INDEX: 25.71 KG/M2 | HEART RATE: 60 BPM

## 2025-06-18 DIAGNOSIS — R94.31 ABNORMAL ELECTROCARDIOGRAPHY: ICD-10-CM

## 2025-06-18 DIAGNOSIS — R06.02 SHORTNESS OF BREATH: ICD-10-CM

## 2025-06-18 LAB
ECHO BSA: 1.84 M2
NUC REST EJECTION FRACTION: 33 %
STRESS BASELINE DIAS BP: 76 MMHG
STRESS BASELINE HR: 58 BPM
STRESS BASELINE SYS BP: 132 MMHG
STRESS ESTIMATED WORKLOAD: 1 METS
STRESS PEAK DIAS BP: 76 MMHG
STRESS PEAK SYS BP: 132 MMHG
STRESS PERCENT HR ACHIEVED: 58 %
STRESS POST PEAK HR: 83 BPM
STRESS RATE PRESSURE PRODUCT: NORMAL BPM*MMHG
STRESS TARGET HR: 142 BPM

## 2025-06-18 PROCEDURE — A9500 TC99M SESTAMIBI: HCPCS

## 2025-06-18 PROCEDURE — 3430000000 HC RX DIAGNOSTIC RADIOPHARMACEUTICAL

## 2025-06-18 PROCEDURE — 93016 CV STRESS TEST SUPVJ ONLY: CPT | Performed by: STUDENT IN AN ORGANIZED HEALTH CARE EDUCATION/TRAINING PROGRAM

## 2025-06-18 PROCEDURE — 93018 CV STRESS TEST I&R ONLY: CPT | Performed by: STUDENT IN AN ORGANIZED HEALTH CARE EDUCATION/TRAINING PROGRAM

## 2025-06-18 PROCEDURE — 93017 CV STRESS TEST TRACING ONLY: CPT

## 2025-06-18 PROCEDURE — 6360000002 HC RX W HCPCS: Performed by: STUDENT IN AN ORGANIZED HEALTH CARE EDUCATION/TRAINING PROGRAM

## 2025-06-18 PROCEDURE — 78452 HT MUSCLE IMAGE SPECT MULT: CPT

## 2025-06-18 PROCEDURE — 78452 HT MUSCLE IMAGE SPECT MULT: CPT | Performed by: STUDENT IN AN ORGANIZED HEALTH CARE EDUCATION/TRAINING PROGRAM

## 2025-06-18 RX ORDER — REGADENOSON 0.08 MG/ML
0.4 INJECTION, SOLUTION INTRAVENOUS ONCE
Status: COMPLETED | OUTPATIENT
Start: 2025-06-18 | End: 2025-06-18

## 2025-06-18 RX ORDER — TETRAKIS(2-METHOXYISOBUTYLISOCYANIDE)COPPER(I) TETRAFLUOROBORATE 1 MG/ML
30 INJECTION, POWDER, LYOPHILIZED, FOR SOLUTION INTRAVENOUS
Status: COMPLETED | OUTPATIENT
Start: 2025-06-18 | End: 2025-06-18

## 2025-06-18 RX ORDER — TETRAKIS(2-METHOXYISOBUTYLISOCYANIDE)COPPER(I) TETRAFLUOROBORATE 1 MG/ML
10 INJECTION, POWDER, LYOPHILIZED, FOR SOLUTION INTRAVENOUS
Status: COMPLETED | OUTPATIENT
Start: 2025-06-18 | End: 2025-06-18

## 2025-06-18 RX ADMIN — REGADENOSON 0.4 MG: 0.08 INJECTION, SOLUTION INTRAVENOUS at 11:14

## 2025-06-18 RX ADMIN — TETRAKIS(2-METHOXYISOBUTYLISOCYANIDE)COPPER(I) TETRAFLUOROBORATE 10 MILLICURIE: 1 INJECTION, POWDER, LYOPHILIZED, FOR SOLUTION INTRAVENOUS at 08:43

## 2025-06-18 RX ADMIN — TETRAKIS(2-METHOXYISOBUTYLISOCYANIDE)COPPER(I) TETRAFLUOROBORATE 30 MILLICURIE: 1 INJECTION, POWDER, LYOPHILIZED, FOR SOLUTION INTRAVENOUS at 11:11

## 2025-06-19 ENCOUNTER — TELEPHONE (OUTPATIENT)
Age: 80
End: 2025-06-19

## 2025-06-19 DIAGNOSIS — R94.39 ABNORMAL NUCLEAR STRESS TEST: ICD-10-CM

## 2025-06-19 DIAGNOSIS — I44.7 LBBB (LEFT BUNDLE BRANCH BLOCK): Primary | ICD-10-CM

## 2025-06-19 NOTE — TELEPHONE ENCOUNTER
Patient called, was scheduled per dr Ching from yesterdays procedure. She has questions as to what she can/cannot do etc. Thanks     Patient # [phone]   ##################################    Spoke with the pt, identified the pt with name and .Pt concerned about her nuclear stress test results. She sited she was told she had some blockage, but no one told her what she should do and not do.  I explained to her that I am not proficient in reading stress results but if the results had been urgent or immediately dangerous they would have either kept her at the hospital for a LHC or at least scheduled her for the procedure. Since the instructions were for her to see Dr. Ching on , means that this is probably something we will be doing in the near future, but it's not urgent. Dr. Ching will be going over everything with her at her appointment on , and if a LHC is the next step then we will be scheduling her for the procedure before she leaves the office. She verbalized understanding and agreement.

## 2025-06-19 NOTE — PROGRESS NOTES
Eddie Hall,     Tried to call but phone line just kept ringing. Your nuclear stress test came back abnormal. The cardiologist reached out to me, Dr. Lorenzo Ching, to see about getting you a sooner referral to general cardiology while you are waiting to talk with the electrophysiologist. I think that is a good idea so I placed an urgent order for Dr. Ching to see you and wanted to let you know so you could call scheduling.       [phone]      Here in the number to call and schedule.      In the mean time, I recommend taking a baby aspirin 81mg daily for heart protection.      Reach out with questions/concerns.     Sincerely,     Dr. Silvestre          Called and spoke with pt, and she has been advised and states understanding of message above and agrees to plan.

## 2025-06-19 NOTE — TELEPHONE ENCOUNTER
Patient called, was scheduled per dr Ching from yesterdays procedure. She has questions as to what she can/cannot do etc. Thanks     Patient # [phone]

## 2025-06-23 ENCOUNTER — TELEMEDICINE (OUTPATIENT)
Facility: CLINIC | Age: 80
End: 2025-06-23
Payer: MEDICARE

## 2025-06-23 DIAGNOSIS — I21.4 NON-ST ELEVATION MYOCARDIAL INFARCTION (NSTEMI) (HCC): ICD-10-CM

## 2025-06-23 DIAGNOSIS — E78.00 PURE HYPERCHOLESTEROLEMIA: Primary | ICD-10-CM

## 2025-06-23 DIAGNOSIS — I10 PRIMARY HYPERTENSION: ICD-10-CM

## 2025-06-23 PROBLEM — R79.89 ELEVATED SERUM CREATININE: Status: RESOLVED | Noted: 2024-01-08 | Resolved: 2025-06-23

## 2025-06-23 PROCEDURE — 1159F MED LIST DOCD IN RCRD: CPT | Performed by: FAMILY MEDICINE

## 2025-06-23 PROCEDURE — 1090F PRES/ABSN URINE INCON ASSESS: CPT | Performed by: FAMILY MEDICINE

## 2025-06-23 PROCEDURE — G8419 CALC BMI OUT NRM PARAM NOF/U: HCPCS | Performed by: FAMILY MEDICINE

## 2025-06-23 PROCEDURE — 1123F ACP DISCUSS/DSCN MKR DOCD: CPT | Performed by: FAMILY MEDICINE

## 2025-06-23 PROCEDURE — G8400 PT W/DXA NO RESULTS DOC: HCPCS | Performed by: FAMILY MEDICINE

## 2025-06-23 PROCEDURE — 1036F TOBACCO NON-USER: CPT | Performed by: FAMILY MEDICINE

## 2025-06-23 PROCEDURE — G8427 DOCREV CUR MEDS BY ELIG CLIN: HCPCS | Performed by: FAMILY MEDICINE

## 2025-06-23 PROCEDURE — 99215 OFFICE O/P EST HI 40 MIN: CPT | Performed by: FAMILY MEDICINE

## 2025-06-23 RX ORDER — EZETIMIBE 10 MG/1
10 TABLET ORAL DAILY
Qty: 90 TABLET | Refills: 3 | Status: ON HOLD | OUTPATIENT
Start: 2025-06-23

## 2025-06-23 RX ORDER — METOPROLOL SUCCINATE 25 MG/1
12.5 TABLET, EXTENDED RELEASE ORAL DAILY
Qty: 45 TABLET | Refills: 3 | Status: ON HOLD | OUTPATIENT
Start: 2025-06-23

## 2025-06-23 NOTE — PROGRESS NOTES
Chief Complaint   Patient presents with    Follow-up     Pt fu to discuss stress test results.  Noc at this time

## 2025-06-23 NOTE — PATIENT INSTRUCTIONS
Natural supplements that help cholesterol:    Citrus bergamot 500-1000mg daily.    Red yeast rice 600 twice daily.

## 2025-06-23 NOTE — PROGRESS NOTES
Isabel Foote, was evaluated through a synchronous (real-time) audio-video encounter. The patient (or guardian if applicable) is aware that this is a billable service, which includes applicable co-pays. This Virtual Visit was conducted with patient's (and/or legal guardian's) consent. Patient identification was verified, and a caregiver was present when appropriate.   The patient was located at Home: [id number] [place] [id number]  Provider was located at Facility (Appt Dept): [address] [facility]  [address]  [place] [address]  Confirm you are appropriately licensed, registered, or certified to deliver care in the state where the patient is located as indicated above. If you are not or unsure, please re-schedule the visit: Yes, I confirm.     Isabel Foote (:  1945) is a Established patient, presenting virtually for evaluation of the following:      Below is the assessment and plan developed based on review of pertinent history, physical exam, labs, studies, and medications.     Assessment & Plan  1. Myocardial infarction: Acute.  - Nuclear stress test indicates previous MI with ongoing perfusion issues, suggesting CAD.  - Continue baby aspirin 81 mg BID.  - For sudden onset symptoms, take four baby aspirins, crush, and swallow.  - Prescription for metoprolol 12.5 mg provided for BP and heart rate control, aiding heart remodeling.    2. Hypercholesterolemia: Chronic.  - Prescription for ezetimibe provided.  - Advised citrus bergamot 500-1000 mg OTC.  - Ordered advanced cholesterol panel before starting new medications.    3. Hypertension: Borderline.  - Advised weight reduction to improve BP control.  - Prescription for metoprolol 12.5 mg provided.    Follow-up  - Appointment with Dr. Ching on the first of next week.  - Appointment with Dr. Garcia on  for left bundle branch block.          Subjective   HPI  Review of Systems     History of Present Illness  The

## 2025-06-28 ENCOUNTER — APPOINTMENT (OUTPATIENT)
Facility: HOSPITAL | Age: 80
DRG: 282 | End: 2025-06-28
Payer: MEDICARE

## 2025-06-28 ENCOUNTER — HOSPITAL ENCOUNTER (INPATIENT)
Facility: HOSPITAL | Age: 80
LOS: 4 days | Discharge: HOME | DRG: 282 | End: 2025-07-02
Attending: EMERGENCY MEDICINE | Admitting: FAMILY MEDICINE
Payer: MEDICARE

## 2025-06-28 ENCOUNTER — APPOINTMENT (OUTPATIENT)
Facility: HOSPITAL | Age: 80
DRG: 282 | End: 2025-06-28
Attending: SPECIALIST
Payer: MEDICARE

## 2025-06-28 DIAGNOSIS — I50.22 SYSTOLIC CHF, CHRONIC (HCC): ICD-10-CM

## 2025-06-28 DIAGNOSIS — R07.9 ACUTE CHEST PAIN: Primary | ICD-10-CM

## 2025-06-28 DIAGNOSIS — I48.0 PAROXYSMAL ATRIAL FIBRILLATION (HCC): ICD-10-CM

## 2025-06-28 DIAGNOSIS — I50.20 HEART FAILURE, SYSTOLIC (HCC): ICD-10-CM

## 2025-06-28 DIAGNOSIS — R25.2 LEG CRAMPS: ICD-10-CM

## 2025-06-28 DIAGNOSIS — I10 PRIMARY HYPERTENSION: ICD-10-CM

## 2025-06-28 DIAGNOSIS — I48.91 NEW ONSET A-FIB (HCC): ICD-10-CM

## 2025-06-28 DIAGNOSIS — I20.89 STABLE ANGINA: ICD-10-CM

## 2025-06-28 DIAGNOSIS — I48.91 ATRIAL FIBRILLATION, UNSPECIFIED TYPE (HCC): ICD-10-CM

## 2025-06-28 PROBLEM — I42.9 CARDIOMYOPATHY (HCC): Status: ACTIVE | Noted: 2025-06-28

## 2025-06-28 LAB
ALBUMIN SERPL-MCNC: 3.5 G/DL (ref 3.5–5)
ALBUMIN/GLOB SERPL: 1.1 (ref 1.1–2.2)
ALP SERPL-CCNC: 79 U/L (ref 45–117)
ALT SERPL-CCNC: 43 U/L (ref 12–78)
AMPHET UR QL SCN: NEGATIVE
ANION GAP SERPL CALC-SCNC: 8 MMOL/L (ref 2–12)
APPEARANCE UR: CLEAR
APTT PPP: 24.5 SEC (ref 22.1–31)
APTT PPP: <20 SEC (ref 22.1–31)
AST SERPL-CCNC: 42 U/L (ref 15–37)
BACTERIA URNS QL MICRO: NEGATIVE /HPF
BARBITURATES UR QL SCN: NEGATIVE
BASOPHILS # BLD: 0.03 K/UL (ref 0–0.1)
BASOPHILS NFR BLD: 0.4 % (ref 0–1)
BENZODIAZ UR QL: NEGATIVE
BILIRUB SERPL-MCNC: 0.4 MG/DL (ref 0.2–1)
BILIRUB UR QL: NEGATIVE
BUN SERPL-MCNC: 24 MG/DL (ref 6–20)
BUN/CREAT SERPL: 27 (ref 12–20)
CALCIUM SERPL-MCNC: 9.4 MG/DL (ref 8.5–10.1)
CANNABINOIDS UR QL SCN: NEGATIVE
CHLORIDE SERPL-SCNC: 112 MMOL/L (ref 97–108)
CHOLEST SERPL-MCNC: 154 MG/DL
CK SERPL-CCNC: 320 U/L (ref 26–192)
CO2 SERPL-SCNC: 22 MMOL/L (ref 21–32)
COCAINE UR QL SCN: NEGATIVE
COLOR UR: NORMAL
CREAT SERPL-MCNC: 0.88 MG/DL (ref 0.55–1.02)
DIFFERENTIAL METHOD BLD: NORMAL
EOSINOPHIL # BLD: 0.24 K/UL (ref 0–0.4)
EOSINOPHIL NFR BLD: 3.1 % (ref 0–7)
EPITH CASTS URNS QL MICRO: NORMAL /LPF
ERYTHROCYTE [DISTWIDTH] IN BLOOD BY AUTOMATED COUNT: 12.4 % (ref 11.5–14.5)
GLOBULIN SER CALC-MCNC: 3.1 G/DL (ref 2–4)
GLUCOSE SERPL-MCNC: 100 MG/DL (ref 65–100)
GLUCOSE UR STRIP.AUTO-MCNC: NEGATIVE MG/DL
HCT VFR BLD AUTO: 39.5 % (ref 35–47)
HDLC SERPL-MCNC: 56 MG/DL
HDLC SERPL: 2.8 (ref 0–5)
HGB BLD-MCNC: 13 G/DL (ref 11.5–16)
HGB UR QL STRIP: NEGATIVE
HYALINE CASTS URNS QL MICRO: NORMAL /LPF (ref 0–2)
IMM GRANULOCYTES # BLD AUTO: 0.01 K/UL (ref 0–0.04)
IMM GRANULOCYTES NFR BLD AUTO: 0.1 % (ref 0–0.5)
INR PPP: 1.1 (ref 0.9–1.1)
KETONES UR QL STRIP.AUTO: NEGATIVE MG/DL
LDLC SERPL CALC-MCNC: 88 MG/DL (ref 0–100)
LEUKOCYTE ESTERASE UR QL STRIP.AUTO: NEGATIVE
LYMPHOCYTES # BLD: 3.07 K/UL (ref 0.8–3.5)
LYMPHOCYTES NFR BLD: 39.3 % (ref 12–49)
Lab: NORMAL
MAGNESIUM SERPL-MCNC: 2.3 MG/DL (ref 1.6–2.4)
MCH RBC QN AUTO: 29.6 PG (ref 26–34)
MCHC RBC AUTO-ENTMCNC: 32.9 G/DL (ref 30–36.5)
MCV RBC AUTO: 90 FL (ref 80–99)
METHADONE UR QL: NEGATIVE
MONOCYTES # BLD: 0.58 K/UL (ref 0–1)
MONOCYTES NFR BLD: 7.4 % (ref 5–13)
NEUTS SEG # BLD: 3.88 K/UL (ref 1.8–8)
NEUTS SEG NFR BLD: 49.7 % (ref 32–75)
NITRITE UR QL STRIP.AUTO: NEGATIVE
NRBC # BLD: 0 K/UL (ref 0–0.01)
NRBC BLD-RTO: 0 PER 100 WBC
NT PRO BNP: 3772 PG/ML
OPIATES UR QL: NEGATIVE
PCP UR QL: NEGATIVE
PH UR STRIP: 6 (ref 5–8)
PHOSPHATE SERPL-MCNC: 4.3 MG/DL (ref 2.6–4.7)
PLATELET # BLD AUTO: 183 K/UL (ref 150–400)
PMV BLD AUTO: 10.1 FL (ref 8.9–12.9)
POTASSIUM SERPL-SCNC: 4.1 MMOL/L (ref 3.5–5.1)
PROT SERPL-MCNC: 6.6 G/DL (ref 6.4–8.2)
PROT UR STRIP-MCNC: NEGATIVE MG/DL
PROTHROMBIN TIME: 11.5 SEC (ref 9.2–11.2)
RBC # BLD AUTO: 4.39 M/UL (ref 3.8–5.2)
RBC #/AREA URNS HPF: NORMAL /HPF (ref 0–5)
SODIUM SERPL-SCNC: 142 MMOL/L (ref 136–145)
SP GR UR REFRACTOMETRY: 1.01 (ref 1–1.03)
THERAPEUTIC RANGE: ABNORMAL SECS (ref 58–77)
THERAPEUTIC RANGE: NORMAL SECS (ref 58–77)
TRIGL SERPL-MCNC: 50 MG/DL
TROPONIN I SERPL HS-MCNC: 21 NG/L (ref 0–51)
TROPONIN I SERPL HS-MCNC: 25 NG/L (ref 0–51)
TROPONIN I SERPL HS-MCNC: 28 NG/L (ref 0–51)
TROPONIN I SERPL HS-MCNC: 36 NG/L (ref 0–51)
UFH PPP CHRO-ACNC: 0.84 IU/ML
UFH PPP CHRO-ACNC: 0.88 IU/ML
UFH PPP CHRO-ACNC: <0.1 IU/ML
URINE CULTURE IF INDICATED: NORMAL
UROBILINOGEN UR QL STRIP.AUTO: 0.2 EU/DL (ref 0.2–1)
VLDLC SERPL CALC-MCNC: 10 MG/DL
WBC # BLD AUTO: 7.8 K/UL (ref 3.6–11)
WBC URNS QL MICRO: NORMAL /HPF (ref 0–4)

## 2025-06-28 PROCEDURE — 83735 ASSAY OF MAGNESIUM: CPT

## 2025-06-28 PROCEDURE — 99223 1ST HOSP IP/OBS HIGH 75: CPT | Performed by: FAMILY MEDICINE

## 2025-06-28 PROCEDURE — 82550 ASSAY OF CK (CPK): CPT

## 2025-06-28 PROCEDURE — 99223 1ST HOSP IP/OBS HIGH 75: CPT | Performed by: SPECIALIST

## 2025-06-28 PROCEDURE — 96375 TX/PRO/DX INJ NEW DRUG ADDON: CPT

## 2025-06-28 PROCEDURE — 80053 COMPREHEN METABOLIC PANEL: CPT

## 2025-06-28 PROCEDURE — 81001 URINALYSIS AUTO W/SCOPE: CPT

## 2025-06-28 PROCEDURE — 2500000003 HC RX 250 WO HCPCS: Performed by: EMERGENCY MEDICINE

## 2025-06-28 PROCEDURE — 6370000000 HC RX 637 (ALT 250 FOR IP)

## 2025-06-28 PROCEDURE — 85610 PROTHROMBIN TIME: CPT

## 2025-06-28 PROCEDURE — 71045 X-RAY EXAM CHEST 1 VIEW: CPT

## 2025-06-28 PROCEDURE — 84100 ASSAY OF PHOSPHORUS: CPT

## 2025-06-28 PROCEDURE — 99285 EMERGENCY DEPT VISIT HI MDM: CPT

## 2025-06-28 PROCEDURE — 80061 LIPID PANEL: CPT

## 2025-06-28 PROCEDURE — 96374 THER/PROPH/DIAG INJ IV PUSH: CPT

## 2025-06-28 PROCEDURE — 85730 THROMBOPLASTIN TIME PARTIAL: CPT

## 2025-06-28 PROCEDURE — 36415 COLL VENOUS BLD VENIPUNCTURE: CPT

## 2025-06-28 PROCEDURE — 85025 COMPLETE CBC W/AUTO DIFF WBC: CPT

## 2025-06-28 PROCEDURE — 85520 HEPARIN ASSAY: CPT

## 2025-06-28 PROCEDURE — 87086 URINE CULTURE/COLONY COUNT: CPT

## 2025-06-28 PROCEDURE — 80307 DRUG TEST PRSMV CHEM ANLYZR: CPT

## 2025-06-28 PROCEDURE — 6370000000 HC RX 637 (ALT 250 FOR IP): Performed by: SPECIALIST

## 2025-06-28 PROCEDURE — 93005 ELECTROCARDIOGRAM TRACING: CPT | Performed by: EMERGENCY MEDICINE

## 2025-06-28 PROCEDURE — 6360000002 HC RX W HCPCS: Performed by: EMERGENCY MEDICINE

## 2025-06-28 PROCEDURE — 83880 ASSAY OF NATRIURETIC PEPTIDE: CPT

## 2025-06-28 PROCEDURE — 84484 ASSAY OF TROPONIN QUANT: CPT

## 2025-06-28 PROCEDURE — 1100000000 HC RM PRIVATE

## 2025-06-28 PROCEDURE — 2500000003 HC RX 250 WO HCPCS

## 2025-06-28 PROCEDURE — 94761 N-INVAS EAR/PLS OXIMETRY MLT: CPT

## 2025-06-28 RX ORDER — ASPIRIN 81 MG/1
81 TABLET, CHEWABLE ORAL DAILY
Status: DISCONTINUED | OUTPATIENT
Start: 2025-06-29 | End: 2025-07-02

## 2025-06-28 RX ORDER — METOPROLOL SUCCINATE 25 MG/1
25 TABLET, EXTENDED RELEASE ORAL DAILY
Status: DISCONTINUED | OUTPATIENT
Start: 2025-06-29 | End: 2025-07-01

## 2025-06-28 RX ORDER — EZETIMIBE 10 MG/1
10 TABLET ORAL DAILY
Status: DISCONTINUED | OUTPATIENT
Start: 2025-06-29 | End: 2025-07-02 | Stop reason: HOSPADM

## 2025-06-28 RX ORDER — METOPROLOL SUCCINATE 25 MG/1
12.5 TABLET, EXTENDED RELEASE ORAL DAILY
Status: DISCONTINUED | OUTPATIENT
Start: 2025-06-29 | End: 2025-06-28

## 2025-06-28 RX ORDER — MAGNESIUM GLYCINATE 100 MG
200 CAPSULE ORAL DAILY
Status: DISCONTINUED | OUTPATIENT
Start: 2025-06-28 | End: 2025-07-02 | Stop reason: HOSPADM

## 2025-06-28 RX ORDER — ACETAMINOPHEN 325 MG/1
650 TABLET ORAL EVERY 6 HOURS PRN
Status: DISCONTINUED | OUTPATIENT
Start: 2025-06-28 | End: 2025-07-02 | Stop reason: HOSPADM

## 2025-06-28 RX ORDER — ACETAMINOPHEN 650 MG/1
650 SUPPOSITORY RECTAL EVERY 6 HOURS PRN
Status: DISCONTINUED | OUTPATIENT
Start: 2025-06-28 | End: 2025-07-02 | Stop reason: HOSPADM

## 2025-06-28 RX ORDER — SODIUM CHLORIDE 9 MG/ML
INJECTION, SOLUTION INTRAVENOUS PRN
Status: DISCONTINUED | OUTPATIENT
Start: 2025-06-28 | End: 2025-07-02 | Stop reason: HOSPADM

## 2025-06-28 RX ORDER — HEPARIN SODIUM 1000 [USP'U]/ML
80 INJECTION, SOLUTION INTRAVENOUS; SUBCUTANEOUS ONCE
Status: DISCONTINUED | OUTPATIENT
Start: 2025-06-28 | End: 2025-06-29

## 2025-06-28 RX ORDER — HEPARIN SODIUM 1000 [USP'U]/ML
80 INJECTION, SOLUTION INTRAVENOUS; SUBCUTANEOUS PRN
Status: DISCONTINUED | OUTPATIENT
Start: 2025-06-28 | End: 2025-07-02

## 2025-06-28 RX ORDER — SODIUM CHLORIDE 0.9 % (FLUSH) 0.9 %
5-40 SYRINGE (ML) INJECTION PRN
Status: DISCONTINUED | OUTPATIENT
Start: 2025-06-28 | End: 2025-07-02 | Stop reason: HOSPADM

## 2025-06-28 RX ORDER — PANTOPRAZOLE SODIUM 40 MG/1
40 TABLET, DELAYED RELEASE ORAL
Status: DISCONTINUED | OUTPATIENT
Start: 2025-06-29 | End: 2025-07-02 | Stop reason: HOSPADM

## 2025-06-28 RX ORDER — SODIUM CHLORIDE 0.9 % (FLUSH) 0.9 %
5-40 SYRINGE (ML) INJECTION EVERY 12 HOURS SCHEDULED
Status: DISCONTINUED | OUTPATIENT
Start: 2025-06-28 | End: 2025-07-02 | Stop reason: HOSPADM

## 2025-06-28 RX ORDER — ONDANSETRON 2 MG/ML
4 INJECTION INTRAMUSCULAR; INTRAVENOUS EVERY 6 HOURS PRN
Status: DISCONTINUED | OUTPATIENT
Start: 2025-06-28 | End: 2025-06-28

## 2025-06-28 RX ORDER — LOSARTAN POTASSIUM 25 MG/1
12.5 TABLET ORAL
Status: DISCONTINUED | OUTPATIENT
Start: 2025-06-28 | End: 2025-06-29

## 2025-06-28 RX ORDER — HEPARIN SODIUM 1000 [USP'U]/ML
40 INJECTION, SOLUTION INTRAVENOUS; SUBCUTANEOUS PRN
Status: DISCONTINUED | OUTPATIENT
Start: 2025-06-28 | End: 2025-07-02

## 2025-06-28 RX ORDER — POLYETHYLENE GLYCOL 3350 17 G/17G
17 POWDER, FOR SOLUTION ORAL DAILY PRN
Status: DISCONTINUED | OUTPATIENT
Start: 2025-06-28 | End: 2025-07-02 | Stop reason: HOSPADM

## 2025-06-28 RX ORDER — HEPARIN SODIUM 10000 [USP'U]/100ML
5-30 INJECTION, SOLUTION INTRAVENOUS CONTINUOUS
Status: DISCONTINUED | OUTPATIENT
Start: 2025-06-28 | End: 2025-07-02

## 2025-06-28 RX ORDER — DILTIAZEM HYDROCHLORIDE 5 MG/ML
20 INJECTION INTRAVENOUS ONCE
Status: COMPLETED | OUTPATIENT
Start: 2025-06-28 | End: 2025-06-28

## 2025-06-28 RX ORDER — ONDANSETRON 4 MG/1
4 TABLET, ORALLY DISINTEGRATING ORAL EVERY 8 HOURS PRN
Status: DISCONTINUED | OUTPATIENT
Start: 2025-06-28 | End: 2025-06-28

## 2025-06-28 RX ORDER — ALPRAZOLAM 0.5 MG
0.5 TABLET ORAL NIGHTLY PRN
Status: DISCONTINUED | OUTPATIENT
Start: 2025-06-28 | End: 2025-07-02 | Stop reason: HOSPADM

## 2025-06-28 RX ADMIN — ALPRAZOLAM 0.5 MG: 0.5 TABLET ORAL at 19:56

## 2025-06-28 RX ADMIN — SODIUM CHLORIDE, PRESERVATIVE FREE 10 ML: 5 INJECTION INTRAVENOUS at 09:36

## 2025-06-28 RX ADMIN — LOSARTAN POTASSIUM 12.5 MG: 25 TABLET, FILM COATED ORAL at 19:56

## 2025-06-28 RX ADMIN — HEPARIN SODIUM 18 UNITS/KG/HR: 10000 INJECTION, SOLUTION INTRAVENOUS at 04:33

## 2025-06-28 RX ADMIN — DILTIAZEM HYDROCHLORIDE 20 MG: 5 INJECTION, SOLUTION INTRAVENOUS at 03:46

## 2025-06-28 RX ADMIN — SODIUM CHLORIDE, PRESERVATIVE FREE 10 ML: 5 INJECTION INTRAVENOUS at 19:56

## 2025-06-28 ASSESSMENT — PAIN SCALES - GENERAL: PAINLEVEL_OUTOF10: 0

## 2025-06-28 ASSESSMENT — LIFESTYLE VARIABLES
HOW MANY STANDARD DRINKS CONTAINING ALCOHOL DO YOU HAVE ON A TYPICAL DAY: PATIENT DOES NOT DRINK
HOW OFTEN DO YOU HAVE A DRINK CONTAINING ALCOHOL: NEVER

## 2025-06-28 ASSESSMENT — PAIN - FUNCTIONAL ASSESSMENT
PAIN_FUNCTIONAL_ASSESSMENT: NONE - DENIES PAIN
PAIN_FUNCTIONAL_ASSESSMENT: 0-10

## 2025-06-28 NOTE — DISCHARGE INSTRUCTIONS
HOME DISCHARGE INSTRUCTIONS   Isabel Foote / [id number]                                    : 1945  Admission date: 2025                      Discharge date: 2025  _________________________________________________________________________    Please bring this form with you to show your care provider at your follow-up appointment.    Primary care provider: Usama Silvestre    Discharging provider: Jeanie Harkins MD - Family Medicine Resident        Matti Robles MD - Family Medicine Attending      You have been admitted to the hospital with the following diagnoses:  ACUTE DIAGNOSES:  Chest pain [R07.9]  Stable angina [I20.89]  New onset a-fib (HCC) [I48.91]  Acute chest pain [R07.9]      You were hospitalized for a lack of blood flow to your heart (NSTEMI), decreased pumping function of your heart (cardiomyopathy), and irregular heart beat (AFIB). You had a procedure called heart catheterization and no stents were placed.     You are now safe to be discharged. Please follow-up with your PCP for hospital discharge follow-up and management of your other medical conditions.     Below are new/changes to your medications:  - Toprol 50 mg one tablet once a day  - Entresto 24-26 mg one tablet twice a day  - Eliquis 5 mg one tablet twice a day  - Crestor 10mg daily      Follow-up with your PCP for the following:  - Hospital discharge follow-up   - Changes to your medications     Follow-up with your cardiologist for the following:  - Management of your heart conditions  - Holter monitor results   - You might need a cardiac MRI and/or genetic testing outpatient.   - you will need a repeat Echo (heart ultrasound) in 3 months.         . . . . . . . . . . . . . . . . . . . . . . . . . . . . . . . . . . . . . . . . . . . . . . . . . . . . . . . . . . . . . . . . . . . . . . . . . .   FOLLOW-UP CARE RECOMMENDATIONS:    Usama Silvestre MD  [id number] [facility]  [place]

## 2025-06-28 NOTE — ED PROVIDER NOTES
[facility] EMERGENCY DEPARTMENT  EMERGENCY DEPARTMENT ENCOUNTER      Pt Name: Isabel Foote  MRN: [id number]  Birthdate 1945  Date of evaluation: 6/28/2025  Provider: Ladarius Diaz MD    CHIEF COMPLAINT       Chief Complaint   Patient presents with    Chest Pain    Palpitations         HISTORY OF PRESENT ILLNESS   (Location/Symptom, Timing/Onset, Context/Setting, Quality, Duration, Modifying Factors, Severity)  Note limiting factors.   79-year-old female, with a past medical history significant for GERD, hypertension, hypercholesterolemia, primary arthritis, coronary disease who presents to the ER by EMS for evaluation for chest discomfort that she described complex distribution around her heart with palpitation that began this evening as she was taking care of her  going up and down stairs.  The patient admitted to feeling lightheaded and dizzy and called 911 for help.  Upon arrival, the patient was found to be in atrial fibrillation and transported to the ER for further evaluation.  The patient took 325 mg aspirin by mouth prior to calling EMS.  She had a nuclear stress test that was performed last week that was abnormal.  She denies any fever and chills, cough or congestion, neck and back pain, nausea, vomiting, diarrhea, constipation, dysuria, dizziness, extremity weakness or numbness, sick contacts or recent travel.              Lexican scan performed on 6/18/2025:  ·  Stress Combined Conclusion: The study is most consistent with myocardial infarction.  ·  Perfusion Comments: LV perfusion is abnormal.  Sum stress score 7, sum rest score 5, sum difference score  ·  Perfusion Defect: There is a moderate severity left ventricular stress perfusion defect that is medium in size present in the apical septal segment(s) that is predominantly fixed. Perfusion defect was visually and quantitatively present. The defect appears to be eli-infarct ischemia.  ·  ECG: Resting ECG  and nursing note reviewed. Exam conducted with a chaperone present.     CONSTITUTIONAL: Well-appearing; well-nourished; in no apparent distress  HEAD: Normocephalic; atraumatic  EYES: PERRL; EOM intact; conjunctiva and sclera are clear bilaterally.  ENT: No rhinorrhea; normal pharynx with no tonsillar hypertrophy; mucous membranes pink/moist, no erythema, no exudate.  NECK: Supple; non-tender; no cervical lymphadenopathy  CARD: Normal S1, S2; no murmurs, rubs, or gallops. Regular rate and rhythm.  RESP: Normal respiratory effort; breath sounds clear and equal bilaterally; no wheezes, rhonchi, or rales.  ABD: Normal bowel sounds; non-distended; non-tender; no palpable organomegaly, no masses, no bruits.  Back Exam: Normal inspection; no vertebral point tenderness, no CVA tenderness. Normal range of motion.  EXT: Normal ROM in all four extremities; non-tender to palpation; no swelling or deformity; distal pulses are normal, no edema.  SKIN: Warm; dry; no rash.  NEURO:Alert and oriented x 3, coherent, FREEDOM-XII grossly intact, sensory and motor are non-focal.        DIAGNOSTIC RESULTS     EKG: All EKG's are interpreted by the Emergency Department Physician who either signs or Co-signs this chart in the absence of a cardiologist.    ED EKG interpretation:  Rhythm: atrial fib; and regular with left bundle branch block; rate (approx.): 113; Axis: left axis deviation; QRS interval: prolonged; ST/T wave: non-specific changes; in  Lead: multiple; Other findings: abnormal ekg. No Previous EKG Available ; This EKG was interpreted by Ladarius Diaz MD,ED Provider. 5:49 AM      Repeat ED EKG interpretation after Cardizem administration:  Rhythm: normal sinus rhythm; and regular with sinus arrhythmia;. Rate (approx.): 71; Axis: left axis deviation; P wave: normal; QRS interval: prolonged; ST/T wave: non-specific changes; in  Lead: multiple; left bundle branch block; other findings: abnormal EKG.   . Significant Change when compared

## 2025-06-28 NOTE — ED TRIAGE NOTES
Pt arrives via EMS from home. States she felt like she was having 'strain'' on her heart and palpitations that started around 11pm. Had stress test earlier this week has not been to follow up. Has taken 324mg ASA prior to arrival. Cardiologist: Dr. Ching.   Denies hx of AFIB but was in AFIB RVR for EMS.   Arrives with 20G IV in L AC.

## 2025-06-28 NOTE — PROGRESS NOTES
Spiritual Health History and Assessment/Progress Note  [facility]    Advance Care Planning,  ,  ,      Name: Isabel Foote MRN: [id number]    Age: 79 y.o.     Sex: female   Language: English   Restorationism: Yarsanism   Chest pain     Date: 6/28/2025            Total Time Calculated: 45 min              Spiritual Assessment began in [facility] A3 MULTI-SPECIALTY TELEMETRY        Referral/Consult From: Multi-disciplinary team   Encounter Overview/Reason: Advance Care Planning  Service Provided For: Patient    Gris, Belief, Meaning:   Patient identifies as spiritual, is connected with a gris tradition or spiritual practice, and has beliefs or practices that help with coping during difficult times  Family/Friends Other: did not assess      Importance and Influence:  Patient has spiritual/personal beliefs that influence decisions regarding their health  Family/Friends Other: did not assess    Community:  Patient feels well-supported. Support system includes: Spouse/Partner, Children, Gris Community, Friends, and Extended family  Family/Friends Other: did not assess    Assessment and Plan of Care:     Patient Interventions include: Facilitated expression of thoughts and feelings, Explored spiritual coping/struggle/distress, Engaged in theological reflection, Affirmed coping skills/support systems, and Assisted in Advance Care Planning conversation  Family/Friends Interventions include: Facilitated expression of thoughts and feelings    Patient Plan of Care: Spiritual Care available upon further referral  Family/Friends Plan of Care: Spiritual Care available upon further referral     visit for the patient with an Advance Medical Directive (AMD) consult. Reviewed pt's chart and spoke with pt's nurse. Pt sarah through her gris in God and the support of her family--her  and daughter as well as her sister. Pt finds ginny in being active, caring for her horse and gardening. No spiritual or emotional

## 2025-06-28 NOTE — ACP (ADVANCE CARE PLANNING)
Advance Care Planning     Advance Care Planning Inpatient Note  Spiritual [facility] Department    Today's Date: 6/28/2025  Unit: [facility] A3 MULTI-SPECIALTY TELEMETRY    Received request from admission screening.  Upon review of chart and communication with care team, patient's decision making abilities are not in question.. Patient and pt's sister was/were present in the room during visit.    Goals of ACP Conversation:  Discuss advance care planning documents    Health Care Decision Makers:       Primary Decision Maker: Jeff Foote - Spouse - [phone]  Summary:  Documented Next of Kin, per patient report    Advance Care Planning Documents (Patient Wishes):  None     Assessment:   visit for the patient with an Advance Medical Directive (AMD) consult. Reviewed pt's chart and spoke with pt's nurse. Reviewed purpose of AMD. Reviewed [place]'s hierarchy of decision makers. Pt shared her  is her Primary Health Care Decision Maker. In addition to her spouse, pt has one daughter. Reviewed AMD. Pt shared she would like to complete the AMD at home with her spouse. Shared instructions to complete the AMD at home, the process to upload the document into My Chart as well as presenting the completed form at a physician's appointment for scanning.         Interventions:  Provided education on documents for clarity and greater understanding  Discussed and provided education on state decision maker hierarchy  Encouraged ongoing ACP conversation with future decision makers and loved ones  Reviewed but did not complete ACP document    Care Preferences Communicated:   No    Outcomes/Plan:  ACP Discussion: Completed    Electronically signed by ELVA Youngblood on 6/28/2025 at 12:35 PM

## 2025-06-28 NOTE — PLAN OF CARE
Problem: Safety - Adult  Goal: Free from fall injury  Outcome: Progressing     Problem: Discharge Planning  Goal: Discharge to home or other facility with appropriate resources  Outcome: Progressing  Flowsheets  Taken 6/28/2025 0930 by James Abdul RN  Discharge to home or other facility with appropriate resources: Identify barriers to discharge with patient and caregiver  Taken 6/28/2025 0900 by James Abdul RN  Discharge to home or other facility with appropriate resources: Identify barriers to discharge with patient and caregiver

## 2025-06-28 NOTE — CONSULTS
[facility] CARDIOLOGY                       Cardiology Care Note     [x]Initial Encounter     []Follow-up    Patient Name: Isabel Foote - :1945 - MRN:[id number]  Primary Cardiologist:   Consulting Cardiologist: Sunita La MD     Reason for encounter:  Chest pain, Afib       HPI:       Isabel Foote is a 79 y.o. female with PMH HLD, HTN, LBBB, Anxiety w/ Insomnia who presents to the ER complaining of  chest pain.     Symptoms started 25 around 11 PM - Had CP associated with nausea.    Has had palpitations since she had COVID in 10/24.      Lexiscan Cardiolite 25 -  Fixed apical defect.  SSS 7, SRS 5.  Dilated LV, LVEF 33%    Has appt with Dr. Chacon and Dr. Ching in July.   She was just started on Zetia, metoprolol, and aspirin 2 days prior to presentation.      She feels fine this AM        Assessment and Plan       Chest pain and cardiomyopathy   - EKG  shows a LBBB (she did not have cardiac symptoms back then)   - Lexiscan Cardiolite 25 -  Fixed apical defect.  SSS 7, SRS 5.  Dilated LV, LVEF 33%  - She p/w CP on 25   - check an echo   - check a cardiac cath given cardiomyopathy, abnormal stress test, and chest pain (risks of MI, CVA, death, etc)   - Will need GDMT --> proceed with Toprol XL and low dose losartan     Afib   - newly discovered 25  - onset unknown (EKG 25 shows sinus)   - EKG 25 #1 - Afib, LBBB  - EKG 25 #2 - NSR, LBBB  ---> plan for rate control and anticoagulation to start   - cont toprol XL   - On IV heparin for now but plan for Eliquis on discharge   - consider 2 week Holter on discharge for Afib burden     Dyslipidemia   - Cont Zetia   - Patient reluctant to take a statin due to concern of side effects     LBBB  - noted as far back as  EKG            ____________________________________________________________    Cardiac testing    Telemetry checked - sinus     EKG 9/3/24  Time: 06/28/25  2:52 AM   Result Value Ref Range    Ventricular Rate 113 BPM    QRS Duration 154 ms    Q-T Interval 404 ms    QTc Calculation (Bazett) 554 ms    R Axis -53 degrees    T Axis 111 degrees    Diagnosis       Atrial fibrillation with rapid ventricular response  Left axis deviation  Left bundle branch block  Abnormal ECG  When compared with ECG of 18-JUN-2025 10:49,  MANUAL COMPARISON REQUIRED, DATA IS UNCONFIRMED     CBC with Auto Differential    Collection Time: 06/28/25  3:00 AM   Result Value Ref Range    WBC 7.8 3.6 - 11.0 K/uL    RBC 4.39 3.80 - 5.20 M/uL    Hemoglobin 13.0 11.5 - 16.0 g/dL    Hematocrit 39.5 35.0 - 47.0 %    MCV 90.0 80.0 - 99.0 FL    MCH 29.6 26.0 - 34.0 PG    MCHC 32.9 30.0 - 36.5 g/dL    RDW 12.4 11.5 - 14.5 %    Platelets 183 150 - 400 K/uL    MPV 10.1 8.9 - 12.9 FL    Nucleated RBCs 0.0 0  WBC    nRBC 0.00 0.00 - 0.01 K/uL    Neutrophils % 49.7 32.0 - 75.0 %    Lymphocytes % 39.3 12.0 - 49.0 %    Monocytes % 7.4 5.0 - 13.0 %    Eosinophils % 3.1 0.0 - 7.0 %    Basophils % 0.4 0.0 - 1.0 %    Immature Granulocytes % 0.1 0.0 - 0.5 %    Neutrophils Absolute 3.88 1.80 - 8.00 K/UL    Lymphocytes Absolute 3.07 0.80 - 3.50 K/UL    Monocytes Absolute 0.58 0.00 - 1.00 K/UL    Eosinophils Absolute 0.24 0.00 - 0.40 K/UL    Basophils Absolute 0.03 0.00 - 0.10 K/UL    Immature Granulocytes Absolute 0.01 0.00 - 0.04 K/UL    Differential Type AUTOMATED     Comprehensive Metabolic Panel    Collection Time: 06/28/25  3:00 AM   Result Value Ref Range    Sodium 142 136 - 145 mmol/L    Potassium 4.1 3.5 - 5.1 mmol/L    Chloride 112 (H) 97 - 108 mmol/L    CO2 22 21 - 32 mmol/L    Anion Gap 8 2 - 12 mmol/L    Glucose 100 65 - 100 mg/dL    BUN 24 (H) 6 - 20 MG/DL    Creatinine 0.88 0.55 - 1.02 MG/DL    BUN/Creatinine Ratio 27 (H) 12 - 20      Est, Glom Filt Rate 67 >60 ml/min/1.73m2    Calcium 9.4 8.5 - 10.1 MG/DL    Total Bilirubin 0.4 0.2 - 1.0 MG/DL    ALT 43 12 - 78 U/L    AST 42 (H) 15 -

## 2025-06-28 NOTE — ED NOTES
Post 20mg IV diltiazem patient noted to convert to NSR with the BBB, EKG completed, Dr. Diaz made aware, dilt drip was not needed at the time.

## 2025-06-28 NOTE — ED NOTES
Report given to Denise HERNANDEZ with [facility]. Patient needs urine collection and next troponin. Care signed off the patient.

## 2025-06-28 NOTE — H&P
[facility]  [address] [place] [address]   Office [phone], Fax [phone]        Admission H&P     Name: Isabel Foote MRN: [id number]  Sex: Female   YOB: 1945  Age: 79 y.o.  PCP: Usama Silvestre MD     Source of Information: patient, medical records    Chief complaint: \"chest tightness\"    History of Present Illness  Isabel Foote is a 79 y.o. female with PMH HLD, HTN, CAD, Anxiety w/ Insomnia who presents to the ER complaining of:      Patent presenting with \"chest tightness\". Symptoms started last night 6/27 at approximately 2300.  He has associated dyspnea, nausea, palpitations.  Reports she was going up the stairs when she first noticed symptoms.  Chest pain lasted for approximately 20 minutes after resting in bed.  Pain radiated to the left neck.  She has never felt chest pain like this before.  Does report symptoms of palpitations in the past starting in October 2024.  Her and her  were sick with COVID at that time and ever since she has felt \" fluttering\" in her chest.  She admits that she tried to brush off her symptoms, however finally addressed them with her PCP 2 weeks ago.  PCP then did EKGs that were reportedly \" abnormal \", prompting a cardiology and electrophysiology referral.  Of note patient had a nuclear stress test done on 6/18 and has an upcoming appointment establish care with Dr. Chacon next month.  Also reports she was just started on Zetia, metoprolol, and aspirin 2 days prior to presentation.  She currently denies any chest pain.     In the ER:   In the ED:  Vitals: Temp 98.6   /95      RR 18   SatO2  98% on RA  Labs: Trop 21, BNP 3.7k, CBC & CMP grossly nml  Imaging: CXR NAP  Treatment: Dilt 20 IV, Heparin gtt    EKG: Atrial fibrillation, rate 113, left bundle branch block.       Review of Systems  Review of Systems   Constitutional:  Positive for activity change and fatigue. Negative for fever.   HENT:

## 2025-06-29 ENCOUNTER — APPOINTMENT (OUTPATIENT)
Facility: HOSPITAL | Age: 80
DRG: 282 | End: 2025-06-29
Payer: MEDICARE

## 2025-06-29 PROBLEM — I50.21 SYSTOLIC CHF, ACUTE (HCC): Status: ACTIVE | Noted: 2025-06-29

## 2025-06-29 PROBLEM — I25.5 ISCHEMIC CARDIOMYOPATHY: Status: ACTIVE | Noted: 2025-06-28

## 2025-06-29 PROBLEM — I42.9 CARDIOMYOPATHY (HCC): Status: ACTIVE | Noted: 2025-06-29

## 2025-06-29 LAB
ALBUMIN SERPL-MCNC: 3.2 G/DL (ref 3.5–5)
ALBUMIN/GLOB SERPL: 1.4 (ref 1.1–2.2)
ALP SERPL-CCNC: 69 U/L (ref 45–117)
ALT SERPL-CCNC: 34 U/L (ref 12–78)
ANION GAP SERPL CALC-SCNC: 6 MMOL/L (ref 2–12)
AST SERPL-CCNC: 24 U/L (ref 15–37)
BACTERIA SPEC CULT: NORMAL
BASOPHILS # BLD: 0.03 K/UL (ref 0–0.1)
BASOPHILS NFR BLD: 0.5 % (ref 0–1)
BILIRUB SERPL-MCNC: 0.6 MG/DL (ref 0.2–1)
BUN SERPL-MCNC: 19 MG/DL (ref 6–20)
BUN/CREAT SERPL: 23 (ref 12–20)
CALCIUM SERPL-MCNC: 8.6 MG/DL (ref 8.5–10.1)
CHLORIDE SERPL-SCNC: 112 MMOL/L (ref 97–108)
CO2 SERPL-SCNC: 26 MMOL/L (ref 21–32)
CREAT SERPL-MCNC: 0.83 MG/DL (ref 0.55–1.02)
DIFFERENTIAL METHOD BLD: ABNORMAL
ECHO AO ARCH DIAM: 2.6 CM
ECHO AO ASC DIAM: 3.7 CM
ECHO AO ASCENDING AORTA INDEX: 2.13 CM/M2
ECHO AO ROOT DIAM: 3.5 CM
ECHO AO ROOT INDEX: 2.01 CM/M2
ECHO AR MAX VEL PISA: 3.5 M/S
ECHO AV AREA PEAK VELOCITY: 3.5 CM2
ECHO AV AREA VTI: 3 CM2
ECHO AV AREA/BSA PEAK VELOCITY: 2 CM2/M2
ECHO AV AREA/BSA VTI: 1.7 CM2/M2
ECHO AV MEAN GRADIENT: 3 MMHG
ECHO AV MEAN VELOCITY: 0.8 M/S
ECHO AV PEAK GRADIENT: 5 MMHG
ECHO AV PEAK VELOCITY: 1.1 M/S
ECHO AV REGURGITANT PHT: 735 MS
ECHO AV VELOCITY RATIO: 0.82
ECHO AV VTI: 21.5 CM
ECHO BSA: 1.74 M2
ECHO EST RA PRESSURE: 3 MMHG
ECHO LA DIAMETER INDEX: 2.07 CM/M2
ECHO LA DIAMETER: 3.6 CM
ECHO LA TO AORTIC ROOT RATIO: 1.03
ECHO LA VOL A-L A2C: 44 ML (ref 22–52)
ECHO LA VOL A-L A4C: 23 ML (ref 22–52)
ECHO LA VOL BP: 27 ML (ref 22–52)
ECHO LA VOL MOD A2C: 42 ML (ref 22–52)
ECHO LA VOL MOD A4C: 18 ML (ref 22–52)
ECHO LA VOL/BSA BIPLANE: 16 ML/M2 (ref 16–34)
ECHO LA VOLUME AREA LENGTH: 32 ML
ECHO LA VOLUME INDEX A-L A2C: 25 ML/M2 (ref 16–34)
ECHO LA VOLUME INDEX A-L A4C: 13 ML/M2 (ref 16–34)
ECHO LA VOLUME INDEX AREA LENGTH: 18 ML/M2 (ref 16–34)
ECHO LA VOLUME INDEX MOD A2C: 24 ML/M2 (ref 16–34)
ECHO LA VOLUME INDEX MOD A4C: 10 ML/M2 (ref 16–34)
ECHO LV E' LATERAL VELOCITY: 9.34 CM/S
ECHO LV E' SEPTAL VELOCITY: 10.2 CM/S
ECHO LV EDV A2C: 187 ML
ECHO LV EDV A4C: 171 ML
ECHO LV EDV BP: 179 ML (ref 56–104)
ECHO LV EDV INDEX A4C: 98 ML/M2
ECHO LV EDV INDEX BP: 103 ML/M2
ECHO LV EDV NDEX A2C: 107 ML/M2
ECHO LV EF PHYSICIAN: 25 %
ECHO LV ESV A2C: 112 ML
ECHO LV ESV A4C: 138 ML
ECHO LV ESV BP: 127 ML (ref 19–49)
ECHO LV ESV INDEX A2C: 64 ML/M2
ECHO LV ESV INDEX A4C: 79 ML/M2
ECHO LV ESV INDEX BP: 73 ML/M2
ECHO LV FRACTIONAL SHORTENING: 15 % (ref 28–44)
ECHO LV INTERNAL DIMENSION DIASTOLE INDEX: 3.51 CM/M2
ECHO LV INTERNAL DIMENSION DIASTOLIC: 6.1 CM (ref 3.9–5.3)
ECHO LV INTERNAL DIMENSION SYSTOLIC INDEX: 2.99 CM/M2
ECHO LV INTERNAL DIMENSION SYSTOLIC: 5.2 CM
ECHO LV IVSD: 0.8 CM (ref 0.6–0.9)
ECHO LV MASS 2D: 191.6 G (ref 67–162)
ECHO LV MASS INDEX 2D: 110.1 G/M2 (ref 43–95)
ECHO LV POSTERIOR WALL DIASTOLIC: 0.8 CM (ref 0.6–0.9)
ECHO LV RELATIVE WALL THICKNESS RATIO: 0.26
ECHO LVOT AREA: 4.2 CM2
ECHO LVOT AV VTI INDEX: 0.7
ECHO LVOT DIAM: 2.3 CM
ECHO LVOT MEAN GRADIENT: 1 MMHG
ECHO LVOT PEAK GRADIENT: 3 MMHG
ECHO LVOT PEAK VELOCITY: 0.9 M/S
ECHO LVOT STROKE VOLUME INDEX: 35.8 ML/M2
ECHO LVOT SV: 62.3 ML
ECHO LVOT VTI: 15 CM
ECHO MV E DECELERATION TIME (DT): 149.3 MS
ECHO MV E VELOCITY: 1.05 M/S
ECHO MV E/E' LATERAL: 11.24
ECHO MV E/E' RATIO (AVERAGED): 10.77
ECHO MV E/E' SEPTAL: 10.29
ECHO MV REGURGITANT PEAK GRADIENT: 61 MMHG
ECHO MV REGURGITANT PEAK VELOCITY: 3.9 M/S
ECHO PULMONARY ARTERY END DIASTOLIC PRESSURE: 1 MMHG
ECHO PV MAX VELOCITY: 0.8 M/S
ECHO PV PEAK GRADIENT: 2 MMHG
ECHO PV REGURGITANT MAX VELOCITY: 0.5 M/S
ECHO RIGHT VENTRICULAR SYSTOLIC PRESSURE (RVSP): 25 MMHG
ECHO RV FREE WALL PEAK S': 13.7 CM/S
ECHO RV INTERNAL DIMENSION: 2.8 CM
ECHO RV TAPSE: 2.6 CM (ref 1.7–?)
ECHO TV REGURGITANT MAX VELOCITY: 2.34 M/S
ECHO TV REGURGITANT PEAK GRADIENT: 22 MMHG
EKG ATRIAL RATE: 71 BPM
EKG DIAGNOSIS: NORMAL
EKG DIAGNOSIS: NORMAL
EKG P AXIS: 53 DEGREES
EKG P-R INTERVAL: 184 MS
EKG Q-T INTERVAL: 404 MS
EKG Q-T INTERVAL: 468 MS
EKG QRS DURATION: 154 MS
EKG QRS DURATION: 158 MS
EKG QTC CALCULATION (BAZETT): 508 MS
EKG QTC CALCULATION (BAZETT): 554 MS
EKG R AXIS: -48 DEGREES
EKG R AXIS: -53 DEGREES
EKG T AXIS: 111 DEGREES
EKG T AXIS: 111 DEGREES
EKG VENTRICULAR RATE: 113 BPM
EKG VENTRICULAR RATE: 71 BPM
EOSINOPHIL # BLD: 0.26 K/UL (ref 0–0.4)
EOSINOPHIL NFR BLD: 4.1 % (ref 0–7)
ERYTHROCYTE [DISTWIDTH] IN BLOOD BY AUTOMATED COUNT: 12.6 % (ref 11.5–14.5)
GLOBULIN SER CALC-MCNC: 2.3 G/DL (ref 2–4)
GLUCOSE SERPL-MCNC: 91 MG/DL (ref 65–100)
HCT VFR BLD AUTO: 37.8 % (ref 35–47)
HGB BLD-MCNC: 12.2 G/DL (ref 11.5–16)
IMM GRANULOCYTES # BLD AUTO: 0.01 K/UL (ref 0–0.04)
IMM GRANULOCYTES NFR BLD AUTO: 0.2 % (ref 0–0.5)
LYMPHOCYTES # BLD: 3.18 K/UL (ref 0.8–3.5)
LYMPHOCYTES NFR BLD: 50.2 % (ref 12–49)
MAGNESIUM SERPL-MCNC: 2.4 MG/DL (ref 1.6–2.4)
MCH RBC QN AUTO: 29.8 PG (ref 26–34)
MCHC RBC AUTO-ENTMCNC: 32.3 G/DL (ref 30–36.5)
MCV RBC AUTO: 92.4 FL (ref 80–99)
MONOCYTES # BLD: 0.54 K/UL (ref 0–1)
MONOCYTES NFR BLD: 8.5 % (ref 5–13)
NEUTS SEG # BLD: 2.31 K/UL (ref 1.8–8)
NEUTS SEG NFR BLD: 36.5 % (ref 32–75)
NRBC # BLD: 0 K/UL (ref 0–0.01)
NRBC BLD-RTO: 0 PER 100 WBC
PHOSPHATE SERPL-MCNC: 4.2 MG/DL (ref 2.6–4.7)
PLATELET # BLD AUTO: 148 K/UL (ref 150–400)
PMV BLD AUTO: 10.4 FL (ref 8.9–12.9)
POTASSIUM SERPL-SCNC: 4 MMOL/L (ref 3.5–5.1)
PROT SERPL-MCNC: 5.5 G/DL (ref 6.4–8.2)
RBC # BLD AUTO: 4.09 M/UL (ref 3.8–5.2)
SERVICE CMNT-IMP: NORMAL
SODIUM SERPL-SCNC: 144 MMOL/L (ref 136–145)
UFH PPP CHRO-ACNC: 0.48 IU/ML
UFH PPP CHRO-ACNC: 0.64 IU/ML
UFH PPP CHRO-ACNC: 0.85 IU/ML
WBC # BLD AUTO: 6.3 K/UL (ref 3.6–11)

## 2025-06-29 PROCEDURE — 93010 ELECTROCARDIOGRAM REPORT: CPT | Performed by: SPECIALIST

## 2025-06-29 PROCEDURE — 94761 N-INVAS EAR/PLS OXIMETRY MLT: CPT

## 2025-06-29 PROCEDURE — 84100 ASSAY OF PHOSPHORUS: CPT

## 2025-06-29 PROCEDURE — 6370000000 HC RX 637 (ALT 250 FOR IP)

## 2025-06-29 PROCEDURE — 80053 COMPREHEN METABOLIC PANEL: CPT

## 2025-06-29 PROCEDURE — 1100000000 HC RM PRIVATE

## 2025-06-29 PROCEDURE — 6360000002 HC RX W HCPCS: Performed by: EMERGENCY MEDICINE

## 2025-06-29 PROCEDURE — 83735 ASSAY OF MAGNESIUM: CPT

## 2025-06-29 PROCEDURE — 85025 COMPLETE CBC W/AUTO DIFF WBC: CPT

## 2025-06-29 PROCEDURE — 93306 TTE W/DOPPLER COMPLETE: CPT | Performed by: SPECIALIST

## 2025-06-29 PROCEDURE — 2500000003 HC RX 250 WO HCPCS

## 2025-06-29 PROCEDURE — 99232 SBSQ HOSP IP/OBS MODERATE 35: CPT | Performed by: SPECIALIST

## 2025-06-29 PROCEDURE — 36415 COLL VENOUS BLD VENIPUNCTURE: CPT

## 2025-06-29 PROCEDURE — 85520 HEPARIN ASSAY: CPT

## 2025-06-29 PROCEDURE — 99233 SBSQ HOSP IP/OBS HIGH 50: CPT | Performed by: FAMILY MEDICINE

## 2025-06-29 PROCEDURE — 6370000000 HC RX 637 (ALT 250 FOR IP): Performed by: SPECIALIST

## 2025-06-29 PROCEDURE — 93306 TTE W/DOPPLER COMPLETE: CPT

## 2025-06-29 RX ADMIN — SACUBITRIL AND VALSARTAN 0.5 TABLET: 24; 26 TABLET, FILM COATED ORAL at 20:06

## 2025-06-29 RX ADMIN — PANTOPRAZOLE SODIUM 40 MG: 40 TABLET, DELAYED RELEASE ORAL at 06:00

## 2025-06-29 RX ADMIN — ASPIRIN 81 MG: 81 TABLET, CHEWABLE ORAL at 09:07

## 2025-06-29 RX ADMIN — EZETIMIBE 10 MG: 10 TABLET ORAL at 09:07

## 2025-06-29 RX ADMIN — SODIUM CHLORIDE, PRESERVATIVE FREE 10 ML: 5 INJECTION INTRAVENOUS at 09:09

## 2025-06-29 RX ADMIN — ALPRAZOLAM 0.5 MG: 0.5 TABLET ORAL at 20:06

## 2025-06-29 RX ADMIN — Medication 200 MG: at 20:07

## 2025-06-29 RX ADMIN — SODIUM CHLORIDE, PRESERVATIVE FREE 10 ML: 5 INJECTION INTRAVENOUS at 20:05

## 2025-06-29 RX ADMIN — HEPARIN SODIUM 14 UNITS/KG/HR: 10000 INJECTION, SOLUTION INTRAVENOUS at 01:14

## 2025-06-29 NOTE — PROGRESS NOTES
[address] [id number]  Office [phone]  Fax [phone]       Subjective / Objective     Subjective:  Overnight events: VIVIENEO  Patient seen and examined at bedside. Reports feeling well this AM.    Denies N/V, CP, SOB, abdominal pain, dysuria.    Respiratory: RA  BP (!) 124/90   Pulse 68   Temp 97.7 °F (36.5 °C) (Oral)   Resp 16   Ht 1.676 m (5' 6\")   Wt 65.6 kg (144 lb 10 oz)   SpO2 96%   BMI 23.34 kg/m²    Physical Examination:   General appearance - alert, well appearing, in no distress  Chest - clear to auscultation, symmetric air entry, no wheezes, rales, or rhonchi  Heart - normal rate, regular rhythm; normal S1/S2, no murmurs/rubs/clicks/gallops  Abdomen - nondistended, soft, nontender, no masses or organomegaly  Extremities - no LE edema, distal pulses intact  Skin - warm, dry; no rashes  Neurological - A&Ox3, CN II-XII grossly intact, strength 5/5 in all extremities  Psychiatric - normal speech and thought processes      I/O:  No intake/output data recorded.    Inpatient Medications:  Current Facility-Administered Medications   Medication Dose Route Frequency    heparin (porcine) injection 5,200 Units  80 Units/kg IntraVENous PRN    heparin (porcine) injection 2,600 Units  40 Units/kg IntraVENous PRN    heparin 25,000 units in dextrose 5% 250 mL (premix) infusion  5-30 Units/kg/hr IntraVENous Continuous    ALPRAZolam (XANAX) tablet 0.5 mg  0.5 mg Oral Nightly PRN    ezetimibe (ZETIA) tablet 10 mg  10 mg Oral Daily    pantoprazole (PROTONIX) tablet 40 mg  40 mg Oral QAM AC    sodium chloride flush 0.9 % injection 5-40 mL  5-40 mL IntraVENous 2 times per day    sodium chloride flush 0.9 % injection 5-40 mL  5-40 mL IntraVENous PRN    0.9 % sodium chloride infusion   IntraVENous PRN    polyethylene glycol (GLYCOLAX) packet 17 g  17 g Oral Daily PRN    acetaminophen (TYLENOL) tablet 650 mg  650 mg Oral Q6H PRN    Or    acetaminophen (TYLENOL) suppository 650 mg  650 mg

## 2025-06-29 NOTE — CARE COORDINATION
@ 1400 Case management follow up    Met with patient at bedside, Eliquis coupon given per case management consult request.    Patient had additional questions about medication, call to residents requested to round to bedside for additional q&a.  MITCHELL

## 2025-06-29 NOTE — PLAN OF CARE
Problem: Safety - Adult  Goal: Free from fall injury  Outcome: Progressing     Problem: Discharge Planning  Goal: Discharge to home or other facility with appropriate resources  Outcome: Progressing  Flowsheets (Taken 6/29/2025 6607)  Discharge to home or other facility with appropriate resources:   Identify barriers to discharge with patient and caregiver   Arrange for needed discharge resources and transportation as appropriate   Identify discharge learning needs (meds, wound care, etc)   Refer to discharge planning if patient needs post-hospital services based on physician order or complex needs related to functional status, cognitive ability or social support system     Problem: Chronic Conditions and Co-morbidities  Goal: Patient's chronic conditions and co-morbidity symptoms are monitored and maintained or improved  Outcome: Progressing

## 2025-06-29 NOTE — PROGRESS NOTES
44 %    LV ESV Index BP 73 mL/m2    LV EDV Index  mL/m2    LV ESV Index A4C 79 mL/m2    LV EDV Index A4C 98 mL/m2    LV ESV Index A2C 64 mL/m2    LV EDV Index A2C 107 mL/m2    LVIDd Index 3.51 cm/m2    LVIDs Index 2.99 cm/m2    LV RWT Ratio 0.26     LV Mass 2D 191.6 (A) 67 - 162 g    LV Mass 2D Index 110.1 (A) 43 - 95 g/m2    E/E' Ratio (Averaged) 10.77     E/E' Lateral 11.24     E/E' Septal 10.29     LA Volume Index BP 16 16 - 34 ml/m2    LA Volume Index A/L 18 16 - 34 mL/m2    LVOT Stroke Volume Index 35.8 mL/m2    LVOT Area 4.2 cm2    LA Volume Index A-L A2C 25 16 - 34 mL/m2    LA Volume Index A-L A4C 13 (A) 16 - 34 mL/m2    LA Volume Index MOD A2C 24 16 - 34 ml/m2    LA Volume Index MOD A4C 10 (A) 16 - 34 ml/m2    LA Size Index 2.07 cm/m2    LA/AO Root Ratio 1.03     Ao Root Index 2.01 cm/m2    Ascending Aorta Index 2.13 cm/m2    AV Velocity Ratio 0.82     LVOT:AV VTI Index 0.70     VANESSA/BSA VTI 1.7 cm2/m2    VANSESA/BSA Peak Velocity 2.0 cm2/m2    Est. RA Pressure 3 mmHg    RVSP 25 mmHg    EF Physician 25 %       Lab Results   Component Value Date    CHOL 154 06/28/2025    TRIG 50 06/28/2025    HDL 56 06/28/2025    LDL 88 06/28/2025    VLDL 10 06/28/2025    CHOLHDLRATIO 2.8 06/28/2025           Current medications and allergies:    Allergy:  No Known Allergies      Current Facility-Administered Medications:     sacubitril-valsartan (ENTRESTO) 24-26 MG per tablet 0.5 tablet, 0.5 tablet, Oral, BID, Sunita La MD    heparin (porcine) injection 5,200 Units, 80 Units/kg, IntraVENous, PRN, Ladarius Diaz MD    heparin (porcine) injection 2,600 Units, 40 Units/kg, IntraVENous, PRN, Ladarius Diaz MD    heparin 25,000 units in dextrose 5% 250 mL (premix) infusion, 5-30 Units/kg/hr, IntraVENous, Continuous, Ladarius Diaz MD, Last Rate: 7.9 mL/hr at 06/29/25 1107, 12 Units/kg/hr at 06/29/25 1107    ALPRAZolam (XANAX) tablet 0.5 mg, 0.5 mg, Oral, Nightly PRN, Luz Maria Pennington DO, 0.5 mg at 06/28/25 1956     ezetimibe (ZETIA) tablet 10 mg, 10 mg, Oral, Daily, Rounds, Luz Maria L, DO, 10 mg at 06/29/25 0907    pantoprazole (PROTONIX) tablet 40 mg, 40 mg, Oral, QAM AC, Rounds, Luz Maria L, DO, 40 mg at 06/29/25 0600    sodium chloride flush 0.9 % injection 5-40 mL, 5-40 mL, IntraVENous, 2 times per day, Rounds, Luz Maria L, DO, 10 mL at 06/29/25 0909    sodium chloride flush 0.9 % injection 5-40 mL, 5-40 mL, IntraVENous, PRN, Gorge, Luz Maria L, DO    0.9 % sodium chloride infusion, , IntraVENous, PRN, Gorge, Luz Maria L, DO    polyethylene glycol (GLYCOLAX) packet 17 g, 17 g, Oral, Daily PRN, Rounds, Luz Maria L, DO    acetaminophen (TYLENOL) tablet 650 mg, 650 mg, Oral, Q6H PRN **OR** acetaminophen (TYLENOL) suppository 650 mg, 650 mg, Rectal, Q6H PRN, Gorge, Luz Maria L, DO    aspirin chewable tablet 81 mg, 81 mg, Oral, Daily, Rounds, Luz Maria L, DO, 81 mg at 06/29/25 0907    Magnesium Glycinate CAPS 200 mg (Patient Supplied), 200 mg, Oral, Daily, Gorge, Luz Maria L, DO    metoprolol succinate (TOPROL XL) extended release tablet 25 mg, 25 mg, Oral, Daily, Sunita La MD Shakil Khan, MD    [facility]  Call center: (P) [phone]  (F) [phone]      CC:Usama Silvestre MD

## 2025-06-30 LAB
ALBUMIN SERPL-MCNC: 3.6 G/DL (ref 3.5–5)
ALBUMIN/GLOB SERPL: 1.1 (ref 1.1–2.2)
ALP SERPL-CCNC: 84 U/L (ref 45–117)
ALT SERPL-CCNC: 33 U/L (ref 12–78)
ANION GAP SERPL CALC-SCNC: 5 MMOL/L (ref 2–12)
AST SERPL-CCNC: 23 U/L (ref 15–37)
BASOPHILS # BLD: 0.03 K/UL (ref 0–0.1)
BASOPHILS NFR BLD: 0.5 % (ref 0–1)
BILIRUB SERPL-MCNC: 0.6 MG/DL (ref 0.2–1)
BUN SERPL-MCNC: 11 MG/DL (ref 6–20)
BUN/CREAT SERPL: 14 (ref 12–20)
CALCIUM SERPL-MCNC: 9.7 MG/DL (ref 8.5–10.1)
CHLORIDE SERPL-SCNC: 110 MMOL/L (ref 97–108)
CO2 SERPL-SCNC: 28 MMOL/L (ref 21–32)
CREAT SERPL-MCNC: 0.77 MG/DL (ref 0.55–1.02)
DIFFERENTIAL METHOD BLD: NORMAL
EOSINOPHIL # BLD: 0.22 K/UL (ref 0–0.4)
EOSINOPHIL NFR BLD: 3.4 % (ref 0–7)
ERYTHROCYTE [DISTWIDTH] IN BLOOD BY AUTOMATED COUNT: 12.5 % (ref 11.5–14.5)
FERRITIN SERPL-MCNC: 183 NG/ML (ref 8–252)
GLOBULIN SER CALC-MCNC: 3.3 G/DL (ref 2–4)
GLUCOSE SERPL-MCNC: 94 MG/DL (ref 65–100)
HCT VFR BLD AUTO: 44.6 % (ref 35–47)
HGB BLD-MCNC: 14.4 G/DL (ref 11.5–16)
IMM GRANULOCYTES # BLD AUTO: 0.01 K/UL (ref 0–0.04)
IMM GRANULOCYTES NFR BLD AUTO: 0.2 % (ref 0–0.5)
IRON SATN MFR SERPL: 46 % (ref 20–50)
IRON SERPL-MCNC: 116 UG/DL (ref 35–150)
LYMPHOCYTES # BLD: 3.03 K/UL (ref 0.8–3.5)
LYMPHOCYTES NFR BLD: 46.8 % (ref 12–49)
MAGNESIUM SERPL-MCNC: 2.2 MG/DL (ref 1.6–2.4)
MCH RBC QN AUTO: 29.6 PG (ref 26–34)
MCHC RBC AUTO-ENTMCNC: 32.3 G/DL (ref 30–36.5)
MCV RBC AUTO: 91.6 FL (ref 80–99)
MONOCYTES # BLD: 0.53 K/UL (ref 0–1)
MONOCYTES NFR BLD: 8.2 % (ref 5–13)
NEUTS SEG # BLD: 2.66 K/UL (ref 1.8–8)
NEUTS SEG NFR BLD: 40.9 % (ref 32–75)
NRBC # BLD: 0 K/UL (ref 0–0.01)
NRBC BLD-RTO: 0 PER 100 WBC
PLATELET # BLD AUTO: 174 K/UL (ref 150–400)
PMV BLD AUTO: 10.6 FL (ref 8.9–12.9)
POTASSIUM SERPL-SCNC: 4.6 MMOL/L (ref 3.5–5.1)
PROT SERPL-MCNC: 6.9 G/DL (ref 6.4–8.2)
RBC # BLD AUTO: 4.87 M/UL (ref 3.8–5.2)
SODIUM SERPL-SCNC: 143 MMOL/L (ref 136–145)
TIBC SERPL-MCNC: 252 UG/DL (ref 250–450)
TSH SERPL DL<=0.05 MIU/L-ACNC: 4.42 UIU/ML (ref 0.36–3.74)
UFH PPP CHRO-ACNC: 0.56 IU/ML
WBC # BLD AUTO: 6.5 K/UL (ref 3.6–11)

## 2025-06-30 PROCEDURE — 6370000000 HC RX 637 (ALT 250 FOR IP): Performed by: SPECIALIST

## 2025-06-30 PROCEDURE — 6370000000 HC RX 637 (ALT 250 FOR IP)

## 2025-06-30 PROCEDURE — 80053 COMPREHEN METABOLIC PANEL: CPT

## 2025-06-30 PROCEDURE — 1100000000 HC RM PRIVATE

## 2025-06-30 PROCEDURE — 84443 ASSAY THYROID STIM HORMONE: CPT

## 2025-06-30 PROCEDURE — 83540 ASSAY OF IRON: CPT

## 2025-06-30 PROCEDURE — 85025 COMPLETE CBC W/AUTO DIFF WBC: CPT

## 2025-06-30 PROCEDURE — 94761 N-INVAS EAR/PLS OXIMETRY MLT: CPT

## 2025-06-30 PROCEDURE — 99232 SBSQ HOSP IP/OBS MODERATE 35: CPT | Performed by: FAMILY MEDICINE

## 2025-06-30 PROCEDURE — 83735 ASSAY OF MAGNESIUM: CPT

## 2025-06-30 PROCEDURE — 36415 COLL VENOUS BLD VENIPUNCTURE: CPT

## 2025-06-30 PROCEDURE — 6360000002 HC RX W HCPCS: Performed by: EMERGENCY MEDICINE

## 2025-06-30 PROCEDURE — 83550 IRON BINDING TEST: CPT

## 2025-06-30 PROCEDURE — 82728 ASSAY OF FERRITIN: CPT

## 2025-06-30 PROCEDURE — 2500000003 HC RX 250 WO HCPCS

## 2025-06-30 PROCEDURE — 85520 HEPARIN ASSAY: CPT

## 2025-06-30 PROCEDURE — 99232 SBSQ HOSP IP/OBS MODERATE 35: CPT | Performed by: SPECIALIST

## 2025-06-30 RX ADMIN — ASPIRIN 81 MG: 81 TABLET, CHEWABLE ORAL at 09:24

## 2025-06-30 RX ADMIN — ALPRAZOLAM 0.5 MG: 0.5 TABLET ORAL at 20:09

## 2025-06-30 RX ADMIN — SODIUM CHLORIDE, PRESERVATIVE FREE 10 ML: 5 INJECTION INTRAVENOUS at 09:25

## 2025-06-30 RX ADMIN — HEPARIN SODIUM 12 UNITS/KG/HR: 10000 INJECTION, SOLUTION INTRAVENOUS at 07:36

## 2025-06-30 RX ADMIN — SACUBITRIL AND VALSARTAN 0.5 TABLET: 24; 26 TABLET, FILM COATED ORAL at 09:25

## 2025-06-30 RX ADMIN — SACUBITRIL AND VALSARTAN 1 TABLET: 24; 26 TABLET, FILM COATED ORAL at 20:12

## 2025-06-30 RX ADMIN — PANTOPRAZOLE SODIUM 40 MG: 40 TABLET, DELAYED RELEASE ORAL at 06:04

## 2025-06-30 RX ADMIN — METOPROLOL SUCCINATE 25 MG: 25 TABLET, EXTENDED RELEASE ORAL at 09:24

## 2025-06-30 RX ADMIN — EZETIMIBE 10 MG: 10 TABLET ORAL at 09:24

## 2025-06-30 RX ADMIN — Medication 200 MG: at 20:12

## 2025-06-30 NOTE — PLAN OF CARE
Problem: Safety - Adult  Goal: Free from fall injury  6/29/2025 2314 by Justine Culp RN  Outcome: Progressing     Problem: Discharge Planning  Goal: Discharge to home or other facility with appropriate resources  6/29/2025 2314 by Justine Culp RN  Outcome: Progressing     Problem: Chronic Conditions and Co-morbidities  Goal: Patient's chronic conditions and co-morbidity symptoms are monitored and maintained or improved  6/29/2025 2314 by Justine Culp RN  Outcome: Progressing

## 2025-06-30 NOTE — PROGRESS NOTES
[address] [place] [address]  Office [phone]  Fax [phone]       Subjective / Objective     Subjective:  Overnight events: VIVIENEO    Patient seen and examined at bedside. Reports feeling well this AM. Denies N/V, CP, SOB, abdominal pain, dysuria.    Respiratory: RA  BP (!) 137/93   Pulse 62   Temp 97.9 °F (36.6 °C) (Oral)   Resp 18   Ht 1.676 m (5' 6\")   Wt 68.2 kg (150 lb 4.8 oz)   SpO2 97%   BMI 24.26 kg/m²    Physical Examination:   General appearance - alert, well appearing, in no distress  Chest - clear to auscultation, symmetric air entry, no wheezes, rales, or rhonchi  Heart - normal rate, regular rhythm; normal S1/S2, no murmurs/rubs/clicks/gallops  Abdomen - nondistended, soft, nontender, no masses or organomegaly  Extremities - no LE edema, distal pulses intact  Skin - warm, dry; no rashes  Neurological - A&Ox3, CN II-XII grossly intact, strength 5/5 in all extremities  Psychiatric - normal speech and thought processes      I/O:  No intake/output data recorded.    Inpatient Medications:  Current Facility-Administered Medications   Medication Dose Route Frequency    sacubitril-valsartan (ENTRESTO) 24-26 MG per tablet 0.5 tablet  0.5 tablet Oral BID    heparin (porcine) injection 5,200 Units  80 Units/kg IntraVENous PRN    heparin (porcine) injection 2,600 Units  40 Units/kg IntraVENous PRN    heparin 25,000 units in dextrose 5% 250 mL (premix) infusion  5-30 Units/kg/hr IntraVENous Continuous    ALPRAZolam (XANAX) tablet 0.5 mg  0.5 mg Oral Nightly PRN    ezetimibe (ZETIA) tablet 10 mg  10 mg Oral Daily    pantoprazole (PROTONIX) tablet 40 mg  40 mg Oral QAM AC    sodium chloride flush 0.9 % injection 5-40 mL  5-40 mL IntraVENous 2 times per day    sodium chloride flush 0.9 % injection 5-40 mL  5-40 mL IntraVENous PRN    0.9 % sodium chloride infusion   IntraVENous PRN    polyethylene glycol (GLYCOLAX) packet 17 g  17 g Oral Daily PRN    acetaminophen (TYLENOL) tablet 650  mg  650 mg Oral Q6H PRN    Or    acetaminophen (TYLENOL) suppository 650 mg  650 mg Rectal Q6H PRN    aspirin chewable tablet 81 mg  81 mg Oral Daily    Magnesium Glycinate CAPS 200 mg (Patient Supplied)  200 mg Oral Daily    metoprolol succinate (TOPROL XL) extended release tablet 25 mg  25 mg Oral Daily     Allergies:  No Known Allergies    CBC:  Recent Labs     06/28/25  0300 06/29/25  0520 06/30/25  0534   WBC 7.8 6.3 6.5   HGB 13.0 12.2 14.4   HCT 39.5 37.8 44.6    148* 174     Metabolic Panel:  Recent Labs     06/28/25  0300 06/29/25  0520 06/30/25  0534    144 143   K 4.1 4.0 4.6   * 112* 110*   CO2 22 26 28   BUN 24* 19 11   CREATININE 0.88 0.83 0.77   GLUCOSE 100 91 94   CALCIUM 9.4 8.6 9.7   ALBUMIN 3.5 3.2* 3.6   AST 42* 24 23   ALT 43 34 33   INR 1.1  --   --      Imaging/procedures:   XR CHEST PORTABLE 06/28/2025    Narrative  INDICATION: Chest Pain    COMPARISON: May 19, 2025    FINDINGS:    AP portable view of the chest demonstrates a normal cardiomediastinal  silhouette. There is no edema, effusion, consolidation, or pneumothorax. The  osseous structures are unremarkable.    Impression  No acute process.    Electronically signed by Mohsen Stubbs    No results found for this or any previous visit from the past 3650 days.          Assessment / Plan     Isabel Foote is a 79 y.o. female with PMH of HLD, HTN, CAD, anxiety with insomnia, GERD who is admitted for NSTEMI with new onset Afib with RVR.    Cardiomyopathy  CAD  Acute HF  POA/trend trop 21>28>36>25; POA BNP 3.7 K. POA EKG with Afib, LBBB. Consequent EKG NSR with LBBB. Nuclear stress test from 5/2025 indicates previous MI. Follows with Dr. Ching. Echo 6/29 with severely reduced LVSF, EF 25%, LV moderately dilated, severe global hypokinesis, abnormal DD.  Started on heparin gtt in ED.  - Continue heparin gtt with plan for Eliquis at d/c (cost might be a barrier, Xarelto likewise expensive)  - GDMT: Toprol 25 mg qd, Entresto

## 2025-06-30 NOTE — ACP (ADVANCE CARE PLANNING)
Advance Care Planning     Advance Care Planning Inpatient Note  Spiritual Care Department    Today's Date: 6/30/2025  Unit: [facility] A3 MULTI-SPECIALTY TELEMETRY    Received request from HealthCare Provider.  Upon review of chart and communication with care team, patient's decision making abilities are not in question.. Patient, Healthcare Decision Maker, Spouse, and , Nurse was/were present in the room during visit.    Goals of ACP Conversation:  Discuss advance care planning documents    Health Care Decision Makers:       Primary Decision Maker: Jeff Foote - Spouse - [phone]    Secondary Decision Maker: PILAR CLAY - Other Relative - [phone]  Summary:  Completed New Documents  Updated Healthcare Decision Maker    Advance Care Planning Documents (Patient Wishes):  Healthcare Power of /Advance Directive Appointment of Health Care Agent  Living Will/Advance Directive     Assessment:  Chart review. Received and responded to spiritual consult requesting assistance in completing advance medical directive (AMD). Completed new AMD. Patient declared her spouse named Surya Foote as her primary decision maker. Spouse goes by the name Jeff. Patient declared her sister named Kena Clay as her secondary decision maker. Her sister goes by the name Pilar. Patient gave all authority to her decision makers minus knight #7, Knight #8. Under section II, question one, patient stated that she does not want treatments to prolong her life. Under section II, question two, patient stated that she does not want treatments to prolong her life. Under section III, patient declined to be an organ donor. Patient's wishes were honored. Please contact spiritual health services for further assistance needed.      Interventions:  Provided education on documents for clarity and greater understanding  Assisted in the completion of documents according to patient's wishes at this time    Care Preferences

## 2025-06-30 NOTE — CARE COORDINATION
Care Management Initial Assessment  6/30/2025 2:38 PM  If patient is discharged prior to next notation, then this note serves as note for discharge by case management.    Reason for Admission:   Chest pain [R07.9]  Stable angina [I20.89]  New onset a-fib (HCC) [I48.91]  Acute chest pain [R07.9]  Procedure(s) (LRB):  Left heart cath / coronary angiography (N/A)       Patient Admission Status: Inpatient  Date Admitted to INP: 6/28/25  RUR: Readmission Risk Score: 6.3    Hospitalization in the last 30 days (Readmission):  No        Advance Care Planning:  Code Status: Full Code  Primary Healthcare Decision Maker: (P) Legal Next of Kin  Primary Decision Maker: DaryJeff - Spouse - [phone]   Advance Directive: has an advanced directive - a copy HAS NOT been provided.     __________________________________________________________________________  Assessment:      06/30/25 1436   Service Assessment   Patient Orientation Alert and Oriented   Cognition Alert   History Provided By Patient   Accompanied By/Relationship Spouse, Jeff   Support Systems Spouse/Significant Other;Family Members   Patient's Healthcare Decision Maker is: Legal Next of Kin   PCP Verified by CM Yes  (Dr. Usama Silvestre)   Last Visit to PCP Within last 3 months   Prior Functional Level Independent in ADLs/IADLs   Current Functional Level Independent in ADLs/IADLs   Can patient return to prior living arrangement Yes   Family able to assist with home care needs: Yes   Financial Resources Medicare   Social/Functional History   Lives With Spouse   Type of Home House   Home Layout Two level   Prior Level of Assist for ADLs Independent   Ambulation Assistance Independent   Active  Yes   Discharge Planning   Patient expects to be discharged to: House   Services At/After Discharge   Confirm Follow Up Transport Family       Comments: Patient has been provided a coupon for Eliquis.  No other CM needs indicated.     Discharge Concerns: []Yes  []Yes []No  Date satisfied:     [] LTC:     [] Home with Hospice   - [place] of Choice offered? [] Yes, Preference:   [] NA    [] Dispatch Health information provided.     [] Other:       Zoë Galan  Case Management Department  For questions or concerns, please Perfect[employer]erve

## 2025-06-30 NOTE — PROGRESS NOTES
Spiritual Health History and Assessment/Progress Note  [facility]    Advance Care Planning,  ,  ,      Name: Isabel Foote MRN: [id number]    Age: 79 y.o.     Sex: female   Language: English   Rastafari: Presybeterian   Atrial fibrillation with RVR (HCC)     Date: 6/30/2025            Total Time Calculated: 32 min              Spiritual Assessment began in [facility] A3 MULTI-SPECIALTY TELEMETRY        Referral/Consult From: Nurse   Encounter Overview/Reason: Advance Care Planning  Service Provided For: Patient, Family    Gris, Belief, Meaning:   Patient is connected with a gris tradition or spiritual practice  Family/Friends are connected with a gris tradition or spiritual practice      Importance and Influence:  Patient has spiritual/personal beliefs that influence decisions regarding their health  Family/Friends have spiritual/personal beliefs that influence decisions regarding the patient's health    Community:  Patient feels well-supported. Support system includes: Spouse/Partner, Children, Gris Community, and Extended family  Family/Friends feel well-supported. Support system includes: Spouse/Partner, Children, Gris Community, and Extended family    Assessment and Plan of Care:   Patient Interventions include: Facilitated expression of thoughts and feelings, Provided sacramental/Scientologist ritual, and Assisted in Advance Care Planning conversation  Family/Friends Interventions include: Facilitated expression of thoughts and feelings, Provided sacramental/Scientologist ritual, and Assisted in Advance Care Planning conversation    Patient Plan of Care: Spiritual Care available upon further referral  Family/Friends Plan of Care: Spiritual Care available upon further referral    Chart review. Received and responded to spiritual consult requesting assistance in completing advance medical directive (AMD). Completed new AMD. Patient declared her spouse named Surya ALSTONNimisha Foote as her primary decision maker.

## 2025-06-30 NOTE — PROGRESS NOTES
[facility] CARDIOLOG[facility]                       Cardiology Care Note     []Initial Encounter     [x]Follow-up    Patient Name: Isabel Foote - :1945 - MRN:[id number]  Primary Cardiologist:   Consulting Cardiologist: Sunita La MD     Reason for encounter:  Chest pain, Afib       HPI:       Isabel Foote is a 79 y.o. female with PMH HLD, HTN, LBBB, Anxiety w/ Insomnia who presents to the ER complaining of  chest pain.     Symptoms started 25 around 11 PM - Had CP associated with nausea.    Has had palpitations since she had COVID in 10/24.      Lexiscan Cardiolite 25 -  Fixed apical defect.  SSS 7, SRS 5.  Dilated LV, LVEF 33%    Has appt with Dr. Chacon and Dr. Ching in July.   She was just started on Zetia, metoprolol, and aspirin 2 days prior to presentation.      Subjective:  She feels fine - tolerating meds        Assessment and Plan       Chest pain and cardiomyopathy   - EKG  shows a LBBB (she did not have cardiac symptoms back then)   - Lexiscan Cardiolite 25 -  Fixed apical defect.  SSS 7, SRS 5.  Dilated LV, LVEF 33%  - She p/w CP on 25   - Echo 25 - Dilated LV, LVEF 25%  - check a cardiac cath given cardiomyopathy, abnormal stress test, and chest pain (risks of MI, CVA, death, etc)   - checked iron profile, TSH.  Consider a cardiac MRI later.   - GDMT --> proceed with Toprol XL and low dose entresto (increase to full step 1)     Afib   - newly discovered 25  - onset unknown (EKG 25 shows sinus)   - EKG 25 #1 - Afib, LBBB  - EKG 25 #2 - NSR, LBBB  ---> plan for rate control and anticoagulation to start   - cont toprol XL   - On IV heparin for now but plan for Eliquis on discharge   - plan for 2 week Holter on discharge for Afib burden     Dyslipidemia   - Cont Zetia   - Patient reluctant to take a statin due to concern of side effects -- will reassess after cath     LBBB  - noted as far

## 2025-07-01 LAB
ALBUMIN SERPL-MCNC: 3.1 G/DL (ref 3.5–5)
ALBUMIN/GLOB SERPL: 1 (ref 1.1–2.2)
ALP SERPL-CCNC: 73 U/L (ref 45–117)
ALT SERPL-CCNC: 29 U/L (ref 12–78)
ANION GAP SERPL CALC-SCNC: 6 MMOL/L (ref 2–12)
AST SERPL-CCNC: 18 U/L (ref 15–37)
BASOPHILS # BLD: 0.03 K/UL (ref 0–0.1)
BASOPHILS NFR BLD: 0.4 % (ref 0–1)
BILIRUB SERPL-MCNC: 0.4 MG/DL (ref 0.2–1)
BUN SERPL-MCNC: 21 MG/DL (ref 6–20)
BUN/CREAT SERPL: 24 (ref 12–20)
CALCIUM SERPL-MCNC: 8.9 MG/DL (ref 8.5–10.1)
CHLORIDE SERPL-SCNC: 112 MMOL/L (ref 97–108)
CO2 SERPL-SCNC: 24 MMOL/L (ref 21–32)
CREAT SERPL-MCNC: 0.87 MG/DL (ref 0.55–1.02)
DIFFERENTIAL METHOD BLD: NORMAL
EOSINOPHIL # BLD: 0.29 K/UL (ref 0–0.4)
EOSINOPHIL NFR BLD: 4.1 % (ref 0–7)
ERYTHROCYTE [DISTWIDTH] IN BLOOD BY AUTOMATED COUNT: 12.8 % (ref 11.5–14.5)
GLOBULIN SER CALC-MCNC: 3 G/DL (ref 2–4)
GLUCOSE SERPL-MCNC: 95 MG/DL (ref 65–100)
HCT VFR BLD AUTO: 39.2 % (ref 35–47)
HGB BLD-MCNC: 12.9 G/DL (ref 11.5–16)
IMM GRANULOCYTES # BLD AUTO: 0.02 K/UL (ref 0–0.04)
IMM GRANULOCYTES NFR BLD AUTO: 0.3 % (ref 0–0.5)
LYMPHOCYTES # BLD: 3.23 K/UL (ref 0.8–3.5)
LYMPHOCYTES NFR BLD: 45.4 % (ref 12–49)
MAGNESIUM SERPL-MCNC: 2.4 MG/DL (ref 1.6–2.4)
MCH RBC QN AUTO: 30 PG (ref 26–34)
MCHC RBC AUTO-ENTMCNC: 32.9 G/DL (ref 30–36.5)
MCV RBC AUTO: 91.2 FL (ref 80–99)
MONOCYTES # BLD: 0.55 K/UL (ref 0–1)
MONOCYTES NFR BLD: 7.7 % (ref 5–13)
NEUTS SEG # BLD: 3 K/UL (ref 1.8–8)
NEUTS SEG NFR BLD: 42.1 % (ref 32–75)
NRBC # BLD: 0 K/UL (ref 0–0.01)
NRBC BLD-RTO: 0 PER 100 WBC
PLATELET # BLD AUTO: 161 K/UL (ref 150–400)
PMV BLD AUTO: 10.5 FL (ref 8.9–12.9)
POTASSIUM SERPL-SCNC: 3.7 MMOL/L (ref 3.5–5.1)
PROT SERPL-MCNC: 6.1 G/DL (ref 6.4–8.2)
RBC # BLD AUTO: 4.3 M/UL (ref 3.8–5.2)
SODIUM SERPL-SCNC: 142 MMOL/L (ref 136–145)
UFH PPP CHRO-ACNC: 0.48 IU/ML
UFH PPP CHRO-ACNC: 0.63 IU/ML
UFH PPP CHRO-ACNC: 0.77 IU/ML
WBC # BLD AUTO: 7.1 K/UL (ref 3.6–11)

## 2025-07-01 PROCEDURE — 83735 ASSAY OF MAGNESIUM: CPT

## 2025-07-01 PROCEDURE — 99232 SBSQ HOSP IP/OBS MODERATE 35: CPT | Performed by: SPECIALIST

## 2025-07-01 PROCEDURE — 94761 N-INVAS EAR/PLS OXIMETRY MLT: CPT

## 2025-07-01 PROCEDURE — 1100000000 HC RM PRIVATE

## 2025-07-01 PROCEDURE — 80053 COMPREHEN METABOLIC PANEL: CPT

## 2025-07-01 PROCEDURE — 6370000000 HC RX 637 (ALT 250 FOR IP)

## 2025-07-01 PROCEDURE — 85520 HEPARIN ASSAY: CPT

## 2025-07-01 PROCEDURE — 6360000002 HC RX W HCPCS: Performed by: EMERGENCY MEDICINE

## 2025-07-01 PROCEDURE — 99232 SBSQ HOSP IP/OBS MODERATE 35: CPT | Performed by: FAMILY MEDICINE

## 2025-07-01 PROCEDURE — 85025 COMPLETE CBC W/AUTO DIFF WBC: CPT

## 2025-07-01 PROCEDURE — 36415 COLL VENOUS BLD VENIPUNCTURE: CPT

## 2025-07-01 PROCEDURE — 6370000000 HC RX 637 (ALT 250 FOR IP): Performed by: SPECIALIST

## 2025-07-01 RX ORDER — METOPROLOL SUCCINATE 50 MG/1
50 TABLET, EXTENDED RELEASE ORAL DAILY
Status: DISCONTINUED | OUTPATIENT
Start: 2025-07-02 | End: 2025-07-02 | Stop reason: HOSPADM

## 2025-07-01 RX ORDER — ASPIRIN 81 MG/1
81 TABLET, CHEWABLE ORAL DAILY
Qty: 30 TABLET | Refills: 3 | Status: CANCELLED | OUTPATIENT
Start: 2025-07-02

## 2025-07-01 RX ADMIN — ASPIRIN 81 MG: 81 TABLET, CHEWABLE ORAL at 08:38

## 2025-07-01 RX ADMIN — SACUBITRIL AND VALSARTAN 1 TABLET: 24; 26 TABLET, FILM COATED ORAL at 19:46

## 2025-07-01 RX ADMIN — ALPRAZOLAM 0.5 MG: 0.5 TABLET ORAL at 19:47

## 2025-07-01 RX ADMIN — HEPARIN SODIUM 11 UNITS/KG/HR: 10000 INJECTION, SOLUTION INTRAVENOUS at 16:11

## 2025-07-01 RX ADMIN — EZETIMIBE 10 MG: 10 TABLET ORAL at 08:38

## 2025-07-01 RX ADMIN — Medication 200 MG: at 19:45

## 2025-07-01 RX ADMIN — SACUBITRIL AND VALSARTAN 1 TABLET: 24; 26 TABLET, FILM COATED ORAL at 08:38

## 2025-07-01 RX ADMIN — METOPROLOL SUCCINATE 25 MG: 25 TABLET, EXTENDED RELEASE ORAL at 08:38

## 2025-07-01 ASSESSMENT — PAIN SCALES - GENERAL: PAINLEVEL_OUTOF10: 0

## 2025-07-01 NOTE — PROGRESS NOTES
[address] [place] [address]   Office [phone]  Fax [phone]          Subjective / Objective     Subjective  Overnight Events: None    Patient seen and examined at bedside. Reports feeling well this AM. Denies CP, SOB, dizziness, lightheadedness, N/V, abd pain, leg swelling. She reports being able to get a discount on Eliquis ($40 per month), which is affordable to her.       Vitals  Weight - Scale: 68.5 kg (151 lb), BP: 123/78    Physical Examination:   General appearance - alert and in no distress  Chest - clear to auscultation, no wheezes, rales or rhonchi, symmetric air entry  Heart - normal rate, regular rhythm, no murmurs, rubs, clicks or gallops, cap refill <2 seconds  Neurological - alert, normal speech  Skin - warm, dry. No notable rashes  Extremities - No LE edema, no clubbing or cyanosis    I/O:  No intake/output data recorded.  Inpatient Medications  Current Facility-Administered Medications   Medication Dose Route Frequency    sacubitril-valsartan (ENTRESTO) 24-26 MG per tablet 1 tablet  1 tablet Oral BID    heparin (porcine) injection 5,200 Units  80 Units/kg IntraVENous PRN    heparin (porcine) injection 2,600 Units  40 Units/kg IntraVENous PRN    heparin 25,000 units in dextrose 5% 250 mL (premix) infusion  5-30 Units/kg/hr IntraVENous Continuous    ALPRAZolam (XANAX) tablet 0.5 mg  0.5 mg Oral Nightly PRN    ezetimibe (ZETIA) tablet 10 mg  10 mg Oral Daily    pantoprazole (PROTONIX) tablet 40 mg  40 mg Oral QAM AC    sodium chloride flush 0.9 % injection 5-40 mL  5-40 mL IntraVENous 2 times per day    sodium chloride flush 0.9 % injection 5-40 mL  5-40 mL IntraVENous PRN    0.9 % sodium chloride infusion   IntraVENous PRN    polyethylene glycol (GLYCOLAX) packet 17 g  17 g Oral Daily PRN    acetaminophen (TYLENOL) tablet 650 mg  650 mg Oral Q6H PRN    Or    acetaminophen (TYLENOL) suppository 650 mg  650 mg Rectal Q6H PRN    aspirin chewable tablet 81 mg  81 mg Oral  consulted, appreciate recs              - Plan for cath 7/1; plan for cardiac MRI later     New Atrial Fibrillation w/ RVR  Now rate/rhythm controlled. Subjective heart palpitations since 10/2025, no prior diagnosis. POA EKG as above. TSH here 4.42, last 1.8 on 5/15. Could be elevated in the setting of acute hospitalization. Anemia labs normal. CHADSVASC score 5. Cards consulted in ED, S/p x 1 IV dilt 10 mg. UA/ucx/UDS neg.   - Heparin gtt, Toprol   - ETOH cessation   - Eliquis on discharge per above  - Cardiology consulted, appreciate recs   -Holter for 2 weeks on discharge  - Repeat TSH outpatient     HLD  POA.  - Continue home Zetia 10 mg qd     Insomnia  POA. Continue home alprazolam 0.5 nightly prn, taking it as scheduled.     GERD  POA. Continue home Prilosec 20 mg qd.  - Sub Protonix for home Prilosec     Hypertension  POA. Toprol as above.         FEN/GI - Cardiac diet   Activity - Ambulate with assistance   DVT prophylaxis - Heparin gtt   GI prophylaxis - Protonix   Fall prophylaxis - Not indicated at this time   Disposition - Likely discharge home pending cath results  Code Status - FULL, discussed with patient / caregivers.   Next of Kin Name and Contact hayley ramirez (Spouse) [phone]     Patient discussed with Matti Robles MD Jennifer L Holler O'Brien, MD  Family Medicine Resident       For Billing    Chief Complaint   Patient presents with    Chest Pain    Palpitations

## 2025-07-01 NOTE — CARE COORDINATION
7/1/25  2:14 PM    Care Management Progress Note    Reason for Admission:   Chest pain [R07.9]  Stable angina [I20.89]  New onset a-fib (HCC) [I48.91]  Acute chest pain [R07.9]  Procedure(s) (LRB):  Left heart cath / coronary angiography (N/A)       Patient Admission Status: Inpatient  RUR: 8%  Hospitalization in the last 30 days (Readmission):  No        Transition of care plan:  Ongoing medical management  Cardiology- cath today, dc with Holter  Discharge plan: Home with family and OP follow up  Discharge plan communicated with patient and/or discharge caregiver: Yes    Date 1st IMM letter given: 6/28/26  Outpatient follow-up-- Cardiology  Transport at discharge: Family    JENNIFER Murray  [facility]      Available via [employer]

## 2025-07-01 NOTE — PLAN OF CARE
Problem: Safety - Adult  Goal: Free from fall injury  7/1/2025 1129 by Kira Castellano, RN  Outcome: Progressing  7/1/2025 0351 by Mason Gonzalez, RN  Outcome: Progressing

## 2025-07-01 NOTE — PROGRESS NOTES
[id number] [place] [address]   Office [phone]  Fax [phone]          Subjective / Objective     Subjective  Overnight Events: None    Patient seen and examined at bedside. Reports feeling well this AM. Denies CP, SOB, dizziness, lightheadedness, N/V, abd pain, leg swelling.     Vitals  Weight - Scale: 64.2 kg (141 lb 9.6 oz), BP: (!) 126/90, Pain 0-10: Pain Level: 0, Location: scattered;     Physical Examination:   General appearance - alert and in no distress  Chest - clear to auscultation, no wheezes, rales or rhonchi, symmetric air entry  Heart - normal rate, regular rhythm, no murmurs, rubs, clicks or gallops, radial pulses intact and symmetric b/l  Neurological - alert, normal speech  Skin - warm, dry. No notable rashes  Extremities - No LE edema, no clubbing or cyanosis    I/O:  No intake/output data recorded.  Inpatient Medications  Current Facility-Administered Medications   Medication Dose Route Frequency    metoprolol succinate (TOPROL XL) extended release tablet 50 mg  50 mg Oral Daily    sacubitril-valsartan (ENTRESTO) 24-26 MG per tablet 1 tablet  1 tablet Oral BID    heparin (porcine) injection 5,200 Units  80 Units/kg IntraVENous PRN    heparin (porcine) injection 2,600 Units  40 Units/kg IntraVENous PRN    heparin 25,000 units in dextrose 5% 250 mL (premix) infusion  5-30 Units/kg/hr IntraVENous Continuous    ALPRAZolam (XANAX) tablet 0.5 mg  0.5 mg Oral Nightly PRN    ezetimibe (ZETIA) tablet 10 mg  10 mg Oral Daily    pantoprazole (PROTONIX) tablet 40 mg  40 mg Oral QAM AC    sodium chloride flush 0.9 % injection 5-40 mL  5-40 mL IntraVENous 2 times per day    sodium chloride flush 0.9 % injection 5-40 mL  5-40 mL IntraVENous PRN    0.9 % sodium chloride infusion   IntraVENous PRN    polyethylene glycol (GLYCOLAX) packet 17 g  17 g Oral Daily PRN    acetaminophen (TYLENOL) tablet 650 mg  650 mg Oral Q6H PRN    Or    acetaminophen (TYLENOL) suppository 650 mg  650 mg  Rectal Q6H PRN    aspirin chewable tablet 81 mg  81 mg Oral Daily    Magnesium Glycinate CAPS 200 mg (Patient Supplied)  200 mg Oral Daily     Allergies  No Known Allergies  CBC:  Recent Labs     06/30/25  0534 07/01/25  0425 07/02/25  0543   WBC 6.5 7.1 6.1   HGB 14.4 12.9 13.8    161 153     Metabolic Panel:  Recent Labs     06/30/25  0534 07/01/25  0425 07/02/25  0543    142 141   K 4.6 3.7 4.0   * 112* 113*   CO2 28 24 24   BUN 11 21* 19   MG 2.2 2.4 2.4   ALT 33 29 43     Imaging/procedures:     Xray Result (most recent):  XR CHEST PORTABLE 06/28/2025    Narrative  INDICATION: Chest Pain    COMPARISON: May 19, 2025    FINDINGS:    AP portable view of the chest demonstrates a normal cardiomediastinal  silhouette. There is no edema, effusion, consolidation, or pneumothorax. The  osseous structures are unremarkable.    Impression  No acute process.    Electronically signed by Mohsen Stubbs      CT Result (most recent):  No results found for this or any previous visit from the past 3650 days.      MRI Result (most recent):  No results found for this or any previous visit from the past 3650 days.             Assessment and Plan     Isabel Foote is a 79 y.o. female with PMHx of HLD, HTN, CAD, anxiety with insomnia, GERD  admitted for NSTEMI and ischemic cardiomyopathy with reduced EF (25%) with new onset Afib with RVR.     Cardiomyopathy  CAD  Acute HF  Trop 21>28>36>25; POA BNP 3.7 K. POA EKG with Afib, LBBB. Consequent EKG NSR with LBBB. Nuclear stress test from 5/2025 indicates previous MI. Follows with Dr. Ching. Echo 6/29 with severely reduced LVSF, EF 25%, LV moderately dilated, severe global hypokinesis, abnormal DD. Started on heparin gtt in ED. Cardiac cath scheduled for 7/1 but delayed due to power outage.   - Continue heparin gtt with plan for Eliquis at d/c   -Pt was able to coordinate outpatient Eliquis for $40 per month, which is affordable for her.   - GDMT: Toprol 50 mg qd,

## 2025-07-01 NOTE — PLAN OF CARE
Problem: Safety - Adult  Goal: Free from fall injury  Outcome: Progressing     Problem: Discharge Planning  Goal: Discharge to home or other facility with appropriate resources  Outcome: Progressing     Problem: Chronic Conditions and Co-morbidities  Goal: Patient's chronic conditions and co-morbidity symptoms are monitored and maintained or improved  Outcome: Progressing     Problem: Cardiovascular - Adult  Goal: Maintains optimal cardiac output and hemodynamic stability  Outcome: Progressing  Goal: Absence of cardiac dysrhythmias or at baseline  Outcome: Progressing     Problem: Hematologic - Adult  Goal: Maintains hematologic stability  Outcome: Progressing

## 2025-07-01 NOTE — DISCHARGE SUMMARY
Discharge / Transfer / Off-Service Note     Name: Isabel Foote MRN: [id number]  Sex: Female   YOB: 1945  Age: 79 y.o.  PCP: Usama Silvestre MD     Date of admission: 6/28/2025  Date of discharge/transfer: 7/2/2025    Attending physician at admission: Dr. Matti Robles.  Attending physician at discharge/transfer: Dr. Matti Robles.  Resident physician at discharge/transfer: Chhaya Parkinson MD     Consultants during hospitalization  IP CONSULT TO CARDIOLOGY  IP CONSULT TO FAMILY MEDICINE  IP CONSULT TO ELECTROPHYSIOLOGY  IP CONSULT TO SPIRITUAL SERVICES  IP CONSULT TO SPIRITUAL SERVICES  IP CONSULT TO CASE MANAGEMENT  IP CONSULT TO SPIRITUAL CARE     Admission diagnoses   Chest pain [R07.9]  Stable angina [I20.89]  New onset a-fib (HCC) [I48.91]  Acute chest pain [R07.9]    Recommended follow-up after discharge    1. PCP-Usama Silvestre MD  2. Cardiology (Dr. La group)     Things to follow up on with PCP:   - Check how patient is doing on Eliquis 5mg BID that was prescribed upon discharge. Any issues with insurance coverage?   - Reassess GDMT: Toprol 50 mg qd, Entresto 24-26 mg bid upon discharge with rec to consider aldactone +/- jardiance later if BP allows. Coordinate with cardiology.   - recheck an echo in 3 months on GDMT. Coordinate with cardiology.  - Repeat TSH outpatient for potential cause of AFIB  - Follow up with Holter monitor results if available.  - Follow up with patient regarding any side effects of Crestor 10mg daily started upon discharge    Things to follow up on with Cardiology:  - Reassess GDMT: Toprol 50 mg qd, Entresto 24-26 mg bid upon discharge with rec to consider aldactone +/- jardiance later if BP allows.   - recheck an echo in 3 months on GDMT.   - consider CRT later if LV remains depressed   -Holter for 2 weeks on discharge    Medication Changes:     Medication List        START taking these medications      apixaban 5 MG Tabs tablet  Commonly known  was visually and quantitatively present. The defect appears to be eli-infarct ischemia.   ECG: Resting ECG demonstrates normal sinus rhythm and left bundle branch block.   Stress Test: A pharmacological stress test was performed using regadenoson (Lexiscan). PO caffeine given as a reversal agent. The patient reported no symptoms during the stress test. The patient reached the end of the protocol. Hemodynamics are adequate for diagnosis. Blood pressure demonstrated a normal response and heart rate demonstrated a normal response to stress.   Resting ECG: ECG is abnormal. The ECG shows sinus rhythm. ECG demonstrates left bundle branch block.   Stress ECG: There were no arrhythmias during stress. There were no noted arrhythmias during recovery.         Diet: Cardiac diet   Activity:  As tolerated     Disposition: Home    Discharge instructions to patient/family  Please seek medical attention for any new or worsening symptoms particularly fever, chest pain, shortness of breath, abdominal pain, nausea, vomiting    Follow up plans/appointments  Follow-up Information       Follow up With Specialties Details Why Contact Info    Usama Silvestre MD Family Medicine Schedule an appointment as soon as possible for a visit in 1 week(s)  [address] [facility]  [address]  [facility] [address]  [phone]      Sunita La MD Cardiovascular Disease Go in 1 week(s) Please follow up with cardiology. They will help you schedule an appointment [address] [facility]  [address]  [facility] [address]  [phone]               Chhaya Parkinson MD  Family Medicine Resident     For Billing    Chief Complaint   Patient presents with    Chest Pain    Palpitations       Principal Problem:    Atrial fibrillation with RVR (HCC)  Active Problems:    Acute chest pain    Ischemic cardiomyopathy    Leg cramps    Systolic CHF, acute (HCC)    Cardiomyopathy (HCC)  Resolved Problems:    Chest pain

## 2025-07-01 NOTE — PLAN OF CARE
Problem: Safety - Adult  Goal: Free from fall injury  7/1/2025 1812 by Melsisa Jacobson, RN  Outcome: Progressing  7/1/2025 1129 by Kira Castellano, MARY  Outcome: Progressing     Problem: Discharge Planning  Goal: Discharge to home or other facility with appropriate resources  Outcome: Progressing     Problem: Chronic Conditions and Co-morbidities  Goal: Patient's chronic conditions and co-morbidity symptoms are monitored and maintained or improved  Outcome: Progressing     Problem: Cardiovascular - Adult  Goal: Maintains optimal cardiac output and hemodynamic stability  Outcome: Progressing  Goal: Absence of cardiac dysrhythmias or at baseline  Outcome: Progressing     Problem: Hematologic - Adult  Goal: Maintains hematologic stability  Outcome: Progressing

## 2025-07-02 VITALS
BODY MASS INDEX: 22.76 KG/M2 | HEART RATE: 75 BPM | WEIGHT: 141.6 LBS | OXYGEN SATURATION: 97 % | DIASTOLIC BLOOD PRESSURE: 77 MMHG | SYSTOLIC BLOOD PRESSURE: 126 MMHG | HEIGHT: 66 IN | TEMPERATURE: 97.5 F | RESPIRATION RATE: 18 BRPM

## 2025-07-02 LAB
ALBUMIN SERPL-MCNC: 3.3 G/DL (ref 3.5–5)
ALBUMIN/GLOB SERPL: 1.1 (ref 1.1–2.2)
ALP SERPL-CCNC: 79 U/L (ref 45–117)
ALT SERPL-CCNC: 43 U/L (ref 12–78)
ANION GAP SERPL CALC-SCNC: 4 MMOL/L (ref 2–12)
AST SERPL-CCNC: 31 U/L (ref 15–37)
BASOPHILS # BLD: 0.03 K/UL (ref 0–0.1)
BASOPHILS NFR BLD: 0.5 % (ref 0–1)
BILIRUB SERPL-MCNC: 0.6 MG/DL (ref 0.2–1)
BUN SERPL-MCNC: 19 MG/DL (ref 6–20)
BUN/CREAT SERPL: 24 (ref 12–20)
CALCIUM SERPL-MCNC: 9.2 MG/DL (ref 8.5–10.1)
CHLORIDE SERPL-SCNC: 113 MMOL/L (ref 97–108)
CO2 SERPL-SCNC: 24 MMOL/L (ref 21–32)
CREAT SERPL-MCNC: 0.8 MG/DL (ref 0.55–1.02)
DIFFERENTIAL METHOD BLD: NORMAL
ECHO BSA: 1.74 M2
EOSINOPHIL # BLD: 0.27 K/UL (ref 0–0.4)
EOSINOPHIL NFR BLD: 4.4 % (ref 0–7)
ERYTHROCYTE [DISTWIDTH] IN BLOOD BY AUTOMATED COUNT: 12.9 % (ref 11.5–14.5)
GLOBULIN SER CALC-MCNC: 3.1 G/DL (ref 2–4)
GLUCOSE SERPL-MCNC: 92 MG/DL (ref 65–100)
HCT VFR BLD AUTO: 42.5 % (ref 35–47)
HGB BLD-MCNC: 13.8 G/DL (ref 11.5–16)
IMM GRANULOCYTES # BLD AUTO: 0.01 K/UL (ref 0–0.04)
IMM GRANULOCYTES NFR BLD AUTO: 0.2 % (ref 0–0.5)
LYMPHOCYTES # BLD: 2.6 K/UL (ref 0.8–3.5)
LYMPHOCYTES NFR BLD: 42.3 % (ref 12–49)
MAGNESIUM SERPL-MCNC: 2.4 MG/DL (ref 1.6–2.4)
MCH RBC QN AUTO: 29.5 PG (ref 26–34)
MCHC RBC AUTO-ENTMCNC: 32.5 G/DL (ref 30–36.5)
MCV RBC AUTO: 90.8 FL (ref 80–99)
MONOCYTES # BLD: 0.55 K/UL (ref 0–1)
MONOCYTES NFR BLD: 9 % (ref 5–13)
NEUTS SEG # BLD: 2.68 K/UL (ref 1.8–8)
NEUTS SEG NFR BLD: 43.6 % (ref 32–75)
NRBC # BLD: 0 K/UL (ref 0–0.01)
NRBC BLD-RTO: 0 PER 100 WBC
PLATELET # BLD AUTO: 153 K/UL (ref 150–400)
PMV BLD AUTO: 10.4 FL (ref 8.9–12.9)
POTASSIUM SERPL-SCNC: 4 MMOL/L (ref 3.5–5.1)
PROT SERPL-MCNC: 6.4 G/DL (ref 6.4–8.2)
RBC # BLD AUTO: 4.68 M/UL (ref 3.8–5.2)
SODIUM SERPL-SCNC: 141 MMOL/L (ref 136–145)
UFH PPP CHRO-ACNC: 0.43 IU/ML
WBC # BLD AUTO: 6.1 K/UL (ref 3.6–11)

## 2025-07-02 PROCEDURE — 6360000004 HC RX CONTRAST MEDICATION: Performed by: INTERNAL MEDICINE

## 2025-07-02 PROCEDURE — C1760 CLOSURE DEV, VASC: HCPCS | Performed by: INTERNAL MEDICINE

## 2025-07-02 PROCEDURE — 80053 COMPREHEN METABOLIC PANEL: CPT

## 2025-07-02 PROCEDURE — 99232 SBSQ HOSP IP/OBS MODERATE 35: CPT | Performed by: SPECIALIST

## 2025-07-02 PROCEDURE — 83735 ASSAY OF MAGNESIUM: CPT

## 2025-07-02 PROCEDURE — 2500000003 HC RX 250 WO HCPCS

## 2025-07-02 PROCEDURE — 85025 COMPLETE CBC W/AUTO DIFF WBC: CPT

## 2025-07-02 PROCEDURE — 4A023N7 MEASUREMENT OF CARDIAC SAMPLING AND PRESSURE, LEFT HEART, PERCUTANEOUS APPROACH: ICD-10-PCS | Performed by: INTERNAL MEDICINE

## 2025-07-02 PROCEDURE — 85520 HEPARIN ASSAY: CPT

## 2025-07-02 PROCEDURE — 2709999900 HC NON-CHARGEABLE SUPPLY: Performed by: INTERNAL MEDICINE

## 2025-07-02 PROCEDURE — C1769 GUIDE WIRE: HCPCS | Performed by: INTERNAL MEDICINE

## 2025-07-02 PROCEDURE — 93458 L HRT ARTERY/VENTRICLE ANGIO: CPT | Performed by: INTERNAL MEDICINE

## 2025-07-02 PROCEDURE — 76937 US GUIDE VASCULAR ACCESS: CPT | Performed by: INTERNAL MEDICINE

## 2025-07-02 PROCEDURE — 99152 MOD SED SAME PHYS/QHP 5/>YRS: CPT | Performed by: INTERNAL MEDICINE

## 2025-07-02 PROCEDURE — C1894 INTRO/SHEATH, NON-LASER: HCPCS | Performed by: INTERNAL MEDICINE

## 2025-07-02 PROCEDURE — 6360000002 HC RX W HCPCS: Performed by: INTERNAL MEDICINE

## 2025-07-02 PROCEDURE — 6370000000 HC RX 637 (ALT 250 FOR IP)

## 2025-07-02 PROCEDURE — 99238 HOSP IP/OBS DSCHRG MGMT 30/<: CPT | Performed by: FAMILY MEDICINE

## 2025-07-02 PROCEDURE — 99153 MOD SED SAME PHYS/QHP EA: CPT | Performed by: INTERNAL MEDICINE

## 2025-07-02 PROCEDURE — 6370000000 HC RX 637 (ALT 250 FOR IP): Performed by: SPECIALIST

## 2025-07-02 PROCEDURE — B2111ZZ FLUOROSCOPY OF MULTIPLE CORONARY ARTERIES USING LOW OSMOLAR CONTRAST: ICD-10-PCS | Performed by: INTERNAL MEDICINE

## 2025-07-02 PROCEDURE — 36415 COLL VENOUS BLD VENIPUNCTURE: CPT

## 2025-07-02 PROCEDURE — 93452 LEFT HRT CATH W/VENTRCLGRPHY: CPT | Performed by: INTERNAL MEDICINE

## 2025-07-02 RX ORDER — ROSUVASTATIN CALCIUM 10 MG/1
10 TABLET, COATED ORAL NIGHTLY
Qty: 30 TABLET | Refills: 3 | Status: SHIPPED | OUTPATIENT
Start: 2025-07-02

## 2025-07-02 RX ORDER — METOPROLOL SUCCINATE 50 MG/1
50 TABLET, EXTENDED RELEASE ORAL DAILY
Qty: 30 TABLET | Refills: 3 | Status: SHIPPED | OUTPATIENT
Start: 2025-07-02

## 2025-07-02 RX ORDER — ROSUVASTATIN CALCIUM 10 MG/1
10 TABLET, COATED ORAL NIGHTLY
Status: DISCONTINUED | OUTPATIENT
Start: 2025-07-02 | End: 2025-07-02 | Stop reason: HOSPADM

## 2025-07-02 RX ORDER — SODIUM CHLORIDE 9 MG/ML
INJECTION, SOLUTION INTRAVENOUS CONTINUOUS
OUTPATIENT
Start: 2025-07-02 | End: 2025-07-02

## 2025-07-02 RX ORDER — LOSARTAN POTASSIUM 25 MG/1
25 TABLET ORAL DAILY
Qty: 90 TABLET | Refills: 0 | Status: SHIPPED | OUTPATIENT
Start: 2025-07-02

## 2025-07-02 RX ORDER — FENTANYL CITRATE 50 UG/ML
INJECTION, SOLUTION INTRAMUSCULAR; INTRAVENOUS PRN
Status: DISCONTINUED | OUTPATIENT
Start: 2025-07-02 | End: 2025-07-02 | Stop reason: HOSPADM

## 2025-07-02 RX ORDER — MIDAZOLAM HYDROCHLORIDE 1 MG/ML
INJECTION INTRAMUSCULAR; INTRAVENOUS PRN
Status: DISCONTINUED | OUTPATIENT
Start: 2025-07-02 | End: 2025-07-02 | Stop reason: HOSPADM

## 2025-07-02 RX ORDER — LIDOCAINE HYDROCHLORIDE 10 MG/ML
INJECTION, SOLUTION INFILTRATION; PERINEURAL PRN
Status: DISCONTINUED | OUTPATIENT
Start: 2025-07-02 | End: 2025-07-02 | Stop reason: HOSPADM

## 2025-07-02 RX ORDER — IOPAMIDOL 755 MG/ML
INJECTION, SOLUTION INTRAVASCULAR PRN
Status: DISCONTINUED | OUTPATIENT
Start: 2025-07-02 | End: 2025-07-02 | Stop reason: HOSPADM

## 2025-07-02 RX ADMIN — SODIUM CHLORIDE, PRESERVATIVE FREE 10 ML: 5 INJECTION INTRAVENOUS at 12:16

## 2025-07-02 RX ADMIN — METOPROLOL SUCCINATE 50 MG: 50 TABLET, EXTENDED RELEASE ORAL at 12:12

## 2025-07-02 RX ADMIN — SACUBITRIL AND VALSARTAN 1 TABLET: 24; 26 TABLET, FILM COATED ORAL at 12:12

## 2025-07-02 RX ADMIN — EZETIMIBE 10 MG: 10 TABLET ORAL at 12:12

## 2025-07-02 ASSESSMENT — PAIN SCALES - GENERAL
PAINLEVEL_OUTOF10: 0
PAINLEVEL_OUTOF10: 0

## 2025-07-02 NOTE — CARE COORDINATION
7/2/25  2:32 PM    CM noted dc order. Reviewed EMR, no CM needs noted       07/02/25 1432   Discharge Planning   Type of Residence House   Living Arrangements Spouse/Significant Other   Current Services Prior To Admission None   Patient expects to be discharged to: House   Services At/After Discharge   Mode of Transport at Discharge Other (see comment)  (Family)       JENNIFER uMrray  [facility]      Available via [employer]

## 2025-07-02 NOTE — PROGRESS NOTES
[facility] CARDIOLOGY                       Cardiology Care Note     []Initial Encounter     [x]Follow-up    Patient Name: Isabel Foote - :1945 - MRN:[id number]  Primary Cardiologist:   Consulting Cardiologist: Sunita La MD     Reason for encounter:  Chest pain, Afib       HPI:       Isabel Foote is a 79 y.o. female with PMH HLD, HTN, LBBB, Anxiety w/ Insomnia who presents to the ER complaining of  chest pain.     Symptoms started 25 around 11 PM - Had CP associated with nausea.    Has had palpitations since she had COVID in 10/24.      Lexiscan Cardiolite 25 -  Fixed apical defect.  SSS 7, SRS 5.  Dilated LV, LVEF 33%    Has appt with Dr. Chacon and Dr. Ching in July.   She was just started on Zetia, metoprolol, and aspirin 2 days prior to presentation.      Subjective:  She feels fine - tolerating meds        Assessment and Plan       Non-ischemic cardiomyopathy   - EKG  shows a LBBB (she did not have cardiac symptoms back then)   - Lexiscan Cardiolite 25 -  Fixed apical defect.  SSS 7, SRS 5.  Dilated LV, LVEF 33%  - She p/w CP on 25   - Echo 25 - Dilated LV, LVEF 25%  - Cardiac Cath 25 - mild CAD, LVEDP 2 mmHg   - Consider a cardiac MRI and genetic testing outpatient   - GDMT --> proceed with Toprol XL and low dose entresto for now -- may consider aldactone +/- Jardiance later if BP allows   - she is volume compensated   - consider CRT later if LV remains depressed (recheck an echo in 3 months on GDMT)     Afib   - newly discovered 25  - onset unknown (EKG 25 shows sinus)   - EKG 25 #1 - Afib, LBBB  - EKG 25 #2 - NSR, LBBB  ---> plan for rate control and anticoagulation to start   - cont toprol XL - at 50 mg daily   - Continue Eliuqis 5 mg BID   - plan for 2 week Holter on discharge for Afib burden     Dyslipidemia   - Cont Zetia   - Given mild CAD on cath and dyslipidemia,  °F (36.4 °C)   TempSrc:   Oral Oral   SpO2: 97% 98% 97% 97%   Weight:       Height:           Gen: Well-developed, well-nourished, in no acute distress  Neck: Supple  Resp: No accessory muscle use, Clear breath sounds, No rales or rhonchi  Card: Regular Rate, Rhythm, No murmurs  Abd:   Soft, non-tender, non-distended, BS+   MSK: No cyanosis  Skin: No rashes    Neuro: Moving all four extremities, follows commands appropriately  Psych: Nml Affect  LE:  No edema    Data Review:     Labs:  Recent Results (from the past 24 hours)   Anti-XA, Heparin    Collection Time: 07/01/25  4:22 PM   Result Value Ref Range    Heparin Xa,LMWH and Unfrac 0.48 IU/mL   Comprehensive Metabolic Panel w/ Reflex to MG    Collection Time: 07/02/25  5:43 AM   Result Value Ref Range    Sodium 141 136 - 145 mmol/L    Potassium 4.0 3.5 - 5.1 mmol/L    Chloride 113 (H) 97 - 108 mmol/L    CO2 24 21 - 32 mmol/L    Anion Gap 4 2 - 12 mmol/L    Glucose 92 65 - 100 mg/dL    BUN 19 6 - 20 MG/DL    Creatinine 0.80 0.55 - 1.02 MG/DL    BUN/Creatinine Ratio 24 (H) 12 - 20      Est, Glom Filt Rate 75 >60 ml/min/1.73m2    Calcium 9.2 8.5 - 10.1 MG/DL    Total Bilirubin 0.6 0.2 - 1.0 MG/DL    ALT 43 12 - 78 U/L    AST 31 15 - 37 U/L    Alk Phosphatase 79 45 - 117 U/L    Total Protein 6.4 6.4 - 8.2 g/dL    Albumin 3.3 (L) 3.5 - 5.0 g/dL    Globulin 3.1 2.0 - 4.0 g/dL    Albumin/Globulin Ratio 1.1 1.1 - 2.2     CBC with Auto Differential    Collection Time: 07/02/25  5:43 AM   Result Value Ref Range    WBC 6.1 3.6 - 11.0 K/uL    RBC 4.68 3.80 - 5.20 M/uL    Hemoglobin 13.8 11.5 - 16.0 g/dL    Hematocrit 42.5 35.0 - 47.0 %    MCV 90.8 80.0 - 99.0 FL    MCH 29.5 26.0 - 34.0 PG    MCHC 32.5 30.0 - 36.5 g/dL    RDW 12.9 11.5 - 14.5 %    Platelets 153 150 - 400 K/uL    MPV 10.4 8.9 - 12.9 FL    Nucleated RBCs 0.0 0  WBC    nRBC 0.00 0.00 - 0.01 K/uL    Neutrophils % 43.6 32.0 - 75.0 %    Lymphocytes % 42.3 12.0 - 49.0 %    Monocytes % 9.0 5.0 - 13.0 %

## 2025-07-02 NOTE — PROCEDURES
PROCEDURE NOTE  BRIEF PROCEDURE NOTE    Date of Procedure: 7/2/2025   Preoperative Diagnosis: Cardiomyopathy  Postoperative Diagnosis: Non obstructive CAD    Procedure: Left heart cath, LV angiography, coronary angiography  Interventional Cardiologist: Eduar Sarabia MD  Assistant : none  Anesthesia: local + IV sedation  I administered moderate sedation throughout this procedure. An independent trained observer pushed medications at my direction, and monitored the patient’s level of consciousness and physiological status throughout.  Estimated Blood Loss: Minimal    Access: R Radial Access -unable to wire; Switched to  R CFA - Ultrasound/Fluoroscopic guided micropuncture access - 6 F sheath    Catheters: RCA : JR4                    LCA : JL5    Findings:     L Main: Very short/Med to large; MLI    LAD: Prox - Med to large; Mid - med; Prox 20%; D1 small  - MLI; D2 - very small;MLI    LCflex: Large; Dominant; MLI; OM1/OM2/Om3 - Med ; MLI    RCA: Small; Non dominant    LVEDP: 2    LVEF:Not assessed    No significant gradient across aortic valve.    PCI: none      Specimens Removed : None    Devices implanted : None    Complications: None    Closure Device: R CFA - Mynx        See full cath note.    Complications: none    Eduar Sarabia MD

## 2025-07-02 NOTE — PROGRESS NOTES
0745: Received patient from waiting area. Armband and allergies verbally confirmed with patient.  Procedure explained and consents signed.    0805: TRANSFER - OUT REPORT:    Verbal report given to Wil on [facility]  being transferred to cath lab for routine progression of patient care       Report consisted of patient's Situation, Background, Assessment and   Recommendations(SBAR).     Information from the following report(s) Nurse Handoff Report was reviewed with the receiving nurse.           Lines:   Peripheral IV 06/28/25 Left Antecubital (Active)   Site Assessment Clean, dry & intact 07/02/25 0800   Line Status Flushed 07/02/25 0800   Line Care Connections checked and tightened 07/01/25 1926   Phlebitis Assessment No symptoms 07/02/25 0800   Infiltration Assessment 0 07/02/25 0800   Alcohol Cap Used No 07/01/25 1926   Dressing Status Clean, dry & intact 07/02/25 0800   Dressing Type Transparent 07/02/25 0800        Opportunity for questions and clarification was provided.      Patient transported with:  Registered Nurse    0855: TRANSFER - IN REPORT:    Verbal report received from Susy HERNANDEZ on [facility]  being received from cath lab for routine post-op      Report consisted of patient's Situation, Background, Assessment and   Recommendations(SBAR).     Information from the following report(s) Nurse Handoff Report was reviewed with the receiving nurse.    Opportunity for questions and clarification was provided.      Assessment completed upon patient's arrival to unit and care assumed.     0855: Patient received from cath lab. Patient with gauze & tegaderm to right groin site. Site clean, dry and intact. No hematoma. VS stable. Pulses palpable. Patient with no complaints at this time. HOB flat    0915: TRANSFER - OUT REPORT:    Verbal report given to Melissa on [facility]  being transferred to [facility] for routine progression of patient care       Report consisted of patient's Situation,  Background, Assessment and   Recommendations(SBAR).     Information from the following report(s) Nurse Handoff Report was reviewed with the receiving nurse.           Lines:   Peripheral IV 06/28/25 Left Antecubital (Active)   Site Assessment Clean, dry & intact 07/02/25 0800   Line Status Flushed 07/02/25 0800   Line Care Connections checked and tightened 07/01/25 1926   Phlebitis Assessment No symptoms 07/02/25 0800   Infiltration Assessment 0 07/02/25 0800   Alcohol Cap Used No 07/01/25 1926   Dressing Status Clean, dry & intact 07/02/25 0800   Dressing Type Transparent 07/02/25 0800        Opportunity for questions and clarification was provided.      Patient transported with:  Monitor and Registered Nurse    0942: Patient transported to [facility]. Site visualized with RN. CDI. No hematoma. No complaints at this time.

## 2025-07-02 NOTE — PLAN OF CARE
Problem: Safety - Adult  Goal: Free from fall injury  7/2/2025 0222 by Mason Gonzalez RN  Outcome: Progressing  7/1/2025 1812 by Melissa Jacobson RN  Outcome: Progressing     Problem: Discharge Planning  Goal: Discharge to home or other facility with appropriate resources  7/2/2025 0222 by Mason Gonzalez RN  Outcome: Progressing  7/1/2025 1812 by Melissa Jacobson RN  Outcome: Progressing     Problem: Chronic Conditions and Co-morbidities  Goal: Patient's chronic conditions and co-morbidity symptoms are monitored and maintained or improved  7/2/2025 0222 by Mason Gonzalez RN  Outcome: Progressing  7/1/2025 1812 by Melissa Jacobson RN  Outcome: Progressing     Problem: Cardiovascular - Adult  Goal: Maintains optimal cardiac output and hemodynamic stability  7/2/2025 0222 by Mason Gonzalez RN  Outcome: Progressing  7/1/2025 1812 by Melissa Jacobson RN  Outcome: Progressing  Goal: Absence of cardiac dysrhythmias or at baseline  7/2/2025 0222 by Mason Gonzalez RN  Outcome: Progressing  7/1/2025 1812 by Melissa Jacobson RN  Outcome: Progressing     Problem: Hematologic - Adult  Goal: Maintains hematologic stability  7/2/2025 0222 by Mason Gonzalez RN  Outcome: Progressing  7/1/2025 1812 by Melissa Jacobson RN  Outcome: Progressing

## 2025-07-03 LAB — ECHO BSA: 1.74 M2

## 2025-07-11 ENCOUNTER — OFFICE VISIT (OUTPATIENT)
Facility: CLINIC | Age: 80
End: 2025-07-11

## 2025-07-11 VITALS
RESPIRATION RATE: 16 BRPM | HEIGHT: 66 IN | HEART RATE: 66 BPM | OXYGEN SATURATION: 97 % | TEMPERATURE: 97.7 F | SYSTOLIC BLOOD PRESSURE: 114 MMHG | DIASTOLIC BLOOD PRESSURE: 76 MMHG | BODY MASS INDEX: 23.88 KG/M2 | WEIGHT: 148.6 LBS

## 2025-07-11 DIAGNOSIS — R79.89 ELEVATED TSH: ICD-10-CM

## 2025-07-11 DIAGNOSIS — I48.91 NEW ONSET A-FIB (HCC): ICD-10-CM

## 2025-07-11 DIAGNOSIS — I20.89 STABLE ANGINA: ICD-10-CM

## 2025-07-11 DIAGNOSIS — Z09 HOSPITAL DISCHARGE FOLLOW-UP: ICD-10-CM

## 2025-07-11 DIAGNOSIS — R07.9 CHEST PAIN, UNSPECIFIED TYPE: Primary | ICD-10-CM

## 2025-07-11 NOTE — PROGRESS NOTES
Chief Complaint   Patient presents with    Follow-Up from Hospital     Isabelspike Foote is a 80 y.o. female who presents for a hospital follow up. She was admitted to [facility] from 06/28 to 07/02 for new onset of Afib.      \"Have you been to the ER, urgent care clinic since your last visit?  Hospitalized since your last visit?\"    YES - When: approximately 1  weeks ago.  Where and Why: Note in chart.    “Have you seen or consulted any other health care providers outside of [facility] since your last visit?”    NO    Click Here for Release of Records Request    
doctor.                CONTINUE taking these medications      apixaban 5 MG Tabs tablet  Commonly known as: ELIQUIS  Take 1 tablet by mouth 2 times daily     ezetimibe 10 MG tablet  Commonly known as: Zetia  Take 1 tablet by mouth daily     losartan 25 MG tablet  Commonly known as: COZAAR  Take 1 tablet by mouth daily If you take the entresto, stop taking losartan.     Magnesium Glycinate 100 MG Caps  Take 400 mg by mouth daily     metoprolol succinate 50 MG extended release tablet  Commonly known as: TOPROL XL  Take 1 tablet by mouth daily     omeprazole 20 MG delayed release capsule  Commonly known as: PRILOSEC     rosuvastatin 10 MG tablet  Commonly known as: CRESTOR  Take 1 tablet by mouth nightly            STOP taking these medications      sacubitril-valsartan 24-26 MG per tablet  Commonly known as: ENTRESTO  Stopped by: VICKIE Heart CNP                Medications marked \"taking\" at this time  Outpatient Medications Marked as Taking for the 7/11/25 encounter (Office Visit) with Linda Anders APRN - CNP   Medication Sig Dispense Refill    metoprolol succinate (TOPROL XL) 50 MG extended release tablet Take 1 tablet by mouth daily 30 tablet 3    apixaban (ELIQUIS) 5 MG TABS tablet Take 1 tablet by mouth 2 times daily 60 tablet 0    rosuvastatin (CRESTOR) 10 MG tablet Take 1 tablet by mouth nightly 30 tablet 3    losartan (COZAAR) 25 MG tablet Take 1 tablet by mouth daily If you take the entresto, stop taking losartan. 90 tablet 0    ezetimibe (ZETIA) 10 MG tablet Take 1 tablet by mouth daily 90 tablet 3    Magnesium Glycinate 100 MG CAPS Take 400 mg by mouth daily 90 capsule 3    omeprazole (PRILOSEC) 20 MG delayed release capsule Take 1 capsule by mouth Daily          Medications patient taking as of now reconciled against medications ordered at time of hospital discharge: Yes    A comprehensive review of systems was negative except for what was noted in the HPI.    Objective:         Vitals:

## 2025-07-11 NOTE — PATIENT INSTRUCTIONS
Today we discussed:  Checking your thyroid to rule this out as a contributor to afib  Please keep your cardiology appts  Mediterranean diet recommendations!  Please continue your new medications.     Thank you and great to see you today!   Please reach out on MyChart with any questions.   Linda Anders, VICKIE - CNP

## 2025-07-14 LAB
THYROGLOB AB SERPL-ACNC: <1 IU/ML (ref 0–0.9)
THYROPEROXIDASE AB SERPL-ACNC: 9 IU/ML (ref 0–34)
TSH SERPL DL<=0.05 MIU/L-ACNC: 1.7 UIU/ML (ref 0.45–4.5)

## 2025-07-16 ENCOUNTER — OFFICE VISIT (OUTPATIENT)
Age: 80
End: 2025-07-16
Payer: MEDICARE

## 2025-07-16 VITALS
HEIGHT: 66 IN | SYSTOLIC BLOOD PRESSURE: 108 MMHG | HEART RATE: 65 BPM | WEIGHT: 146.3 LBS | DIASTOLIC BLOOD PRESSURE: 82 MMHG | BODY MASS INDEX: 23.51 KG/M2 | OXYGEN SATURATION: 97 %

## 2025-07-16 DIAGNOSIS — I48.91 ATRIAL FIBRILLATION, UNSPECIFIED TYPE (HCC): ICD-10-CM

## 2025-07-16 DIAGNOSIS — I42.9 CARDIOMYOPATHY, UNSPECIFIED TYPE (HCC): Primary | ICD-10-CM

## 2025-07-16 DIAGNOSIS — E78.5 DYSLIPIDEMIA: ICD-10-CM

## 2025-07-16 DIAGNOSIS — I44.7 LBBB (LEFT BUNDLE BRANCH BLOCK): ICD-10-CM

## 2025-07-16 PROCEDURE — 1111F DSCHRG MED/CURRENT MED MERGE: CPT

## 2025-07-16 PROCEDURE — 1126F AMNT PAIN NOTED NONE PRSNT: CPT

## 2025-07-16 PROCEDURE — 3079F DIAST BP 80-89 MM HG: CPT

## 2025-07-16 PROCEDURE — 3074F SYST BP LT 130 MM HG: CPT

## 2025-07-16 PROCEDURE — G8427 DOCREV CUR MEDS BY ELIG CLIN: HCPCS

## 2025-07-16 PROCEDURE — G8420 CALC BMI NORM PARAMETERS: HCPCS

## 2025-07-16 PROCEDURE — G8400 PT W/DXA NO RESULTS DOC: HCPCS

## 2025-07-16 PROCEDURE — 1123F ACP DISCUSS/DSCN MKR DOCD: CPT

## 2025-07-16 PROCEDURE — 99214 OFFICE O/P EST MOD 30 MIN: CPT

## 2025-07-16 PROCEDURE — 1090F PRES/ABSN URINE INCON ASSESS: CPT

## 2025-07-16 PROCEDURE — 1036F TOBACCO NON-USER: CPT

## 2025-07-16 PROCEDURE — 1159F MED LIST DOCD IN RCRD: CPT

## 2025-07-16 RX ORDER — VITAMIN B COMPLEX
1 CAPSULE ORAL DAILY
COMMUNITY

## 2025-07-16 ASSESSMENT — PATIENT HEALTH QUESTIONNAIRE - PHQ9
1. LITTLE INTEREST OR PLEASURE IN DOING THINGS: NOT AT ALL
2. FEELING DOWN, DEPRESSED OR HOPELESS: NOT AT ALL
SUM OF ALL RESPONSES TO PHQ QUESTIONS 1-9: 0

## 2025-07-16 NOTE — PROGRESS NOTES
1. Have you been to the ER, urgent care clinic since your last visit?  Hospitalized since your last visit?  6/28/25-7/2/25 [facility]    2. Have you seen or consulted any other health care providers outside of the [facility] since your last visit?  Include any pap smears or colon screening.   No  
Never       FAMILY HISTORY:     Family History   Problem Relation Age of Onset    Heart Disease Mother         Tachicardia    Alcohol Abuse Father     Heart Disease Father         Angina    Substance Abuse Maternal Grandfather         Alcohol abuse    Substance Abuse Maternal Grandmother         Alcohol abuse, cancer    Substance Abuse Paternal Grandfather         Alcohol abuse    Substance Abuse Paternal Grandmother         Diabetes, heart disease no alcohol abuse       REVIEW OF SYMPTOMS:     Review of Symptoms:  Negative except as above, all other systems reviewed and are negative for a Comprehensive ROS (10+)    PHYSICAL EXAM:     Physical Exam:  /82 (BP Site: Left Upper Arm, Patient Position: Sitting)   Pulse 65   Ht 1.676 m (5' 6\")   Wt 66.4 kg (146 lb 4.8 oz)   SpO2 97%   BMI 23.61 kg/m²     General: alert, cooperative, no distress, appears stated age  Neck: supple, symmetrical, trachea midline, no adenopathy, thyroid: not enlarged, symmetric, no tenderness/mass/nodules, no carotid bruit, and no JVD  Lungs: clear to auscultation bilaterally  Heart: irregular rate and rhythm, S1, S2 normal, no murmur, no click, rub or gallop  Abdomen: soft, non tender  Extremities: extremities normal, atraumatic, no cyanosis or edema  Skin: No significant rashes  MSKTL: Overall good ROM ext  Neuro: Grossly intact  Psych: Appropriate affect    LABS / OTHER STUDIES:     Lab Results   Component Value Date/Time     07/02/2025 05:43 AM    K 4.0 07/02/2025 05:43 AM     07/02/2025 05:43 AM    CO2 24 07/02/2025 05:43 AM    BUN 19 07/02/2025 05:43 AM    GLOB 3.1 07/02/2025 05:43 AM    ALT 43 07/02/2025 05:43 AM    AST 31 07/02/2025 05:43 AM       Lab Results   Component Value Date/Time    CHOL 154 06/28/2025 11:20 AM    HDL 56 06/28/2025 11:20 AM    LDL 88 06/28/2025 11:20 AM    VLDL 10 06/28/2025 11:20 AM       Cholesterol, Total   Date Value Ref Range Status   06/28/2025 154 <200 MG/DL Final     Triglycerides

## 2025-07-16 NOTE — PATIENT INSTRUCTIONS
Please check your blood pressure daily (at least one hour after your morning blood pressure medications.)  Keep a written record of your blood pressure readings and bring it to each appointment.  If your systolic blood pressure is consistently greater than 140mmHg or less than 100mmHg then please call the office at [phone].

## 2025-07-23 RX ORDER — LOSARTAN POTASSIUM 25 MG/1
TABLET ORAL
Refills: 0 | OUTPATIENT
Start: 2025-07-23

## 2025-07-23 RX ORDER — ROSUVASTATIN CALCIUM 10 MG/1
TABLET, COATED ORAL
Refills: 0 | OUTPATIENT
Start: 2025-07-23

## 2025-07-23 RX ORDER — METOPROLOL SUCCINATE 50 MG/1
TABLET, EXTENDED RELEASE ORAL
Refills: 0 | OUTPATIENT
Start: 2025-07-23

## 2025-07-28 LAB — ECHO BSA: 1.74 M2

## 2025-07-30 PROBLEM — I44.7 LBBB (LEFT BUNDLE BRANCH BLOCK): Status: ACTIVE | Noted: 2025-07-30

## 2025-07-30 PROBLEM — E78.5 DYSLIPIDEMIA: Status: ACTIVE | Noted: 2025-07-30

## 2025-07-30 NOTE — PROGRESS NOTES
Cardiac Electrophysiology OFFICE Consultation Note       Assessment/Plan:   1. Systolic CHF, acute (HCC)  2. Cardiomyopathy, unspecified type (HCC)  -     EKG 12 Lead  3. Atrial fibrillation, unspecified type (HCC)  -     EKG 12 Lead  4. Dyslipidemia  -     EKG 12 Lead  5. LBBB (left bundle branch block)  -     EKG 12 Lead  6. Ischemic cardiomyopathy  7. Primary hypertension  8. Atrial fibrillation with RVR (HCC)  9. Pure hypercholesterolemia  10. Anticoagulation adequate       Primary Cardiologist: Dr. La      Non-ischemic cardiomyopathy     - Echo 6/29/25 newly depressed on echo in setting of AF RVR LVEF 25%, LV dilated, no significant valvular dysfunciton, LAsize normal.    - Know hx of LBBB, demonstrated on EKG 9/24  - Cardiac Cath 7/2/25 - no signficant disease.    - GDMT -toprol, entresto cost prohibitive- on losartan, BP precludes further titration today   - she is compensated on exam.  - Attempt to start Jardiance 10 mg daily, I am concerned that this may also be cost prohibiting  - Evidence of chronic systolic congestive heart failure with left bundle branch block with  ms and New York Heart Association class II symptoms  - If despite guideline directed medical therapy her LVEF remains to be depressed, she ultimately would greatly benefit from cardiac resynchronization therapy  - Repeat echocardiogram in 3 months  - Follow-up with me after echocardiogram      Atrial fibrillation with RVR   - new onset AF RVR noted on recent admission 6/28/25 Does report some fluttering and generalized fatigue since October 2024.   - Echo 6/2025 EF 25%. LA size normal   - holter 7/2025 AVg hr of 60 bpm ( bpm), no AF noted.   - continue toprolol xl at 50mg daily for rate control   - Continue Eliquis 5 mg BID-no bleeding issues  - Depending on her heart function, could consider either ablation versus CRT implantation and reassessment of burden     Dyslipidemia    - Cont Zetia    - Given mild CAD on cath

## 2025-07-31 ENCOUNTER — OFFICE VISIT (OUTPATIENT)
Age: 80
End: 2025-07-31
Payer: MEDICARE

## 2025-07-31 VITALS
BODY MASS INDEX: 23.14 KG/M2 | DIASTOLIC BLOOD PRESSURE: 84 MMHG | HEIGHT: 66 IN | WEIGHT: 144 LBS | SYSTOLIC BLOOD PRESSURE: 132 MMHG | OXYGEN SATURATION: 98 % | HEART RATE: 72 BPM

## 2025-07-31 DIAGNOSIS — I25.5 ISCHEMIC CARDIOMYOPATHY: ICD-10-CM

## 2025-07-31 DIAGNOSIS — I48.91 ATRIAL FIBRILLATION, UNSPECIFIED TYPE (HCC): ICD-10-CM

## 2025-07-31 DIAGNOSIS — I10 PRIMARY HYPERTENSION: ICD-10-CM

## 2025-07-31 DIAGNOSIS — I48.91 ATRIAL FIBRILLATION WITH RVR (HCC): ICD-10-CM

## 2025-07-31 DIAGNOSIS — I42.9 CARDIOMYOPATHY, UNSPECIFIED TYPE (HCC): ICD-10-CM

## 2025-07-31 DIAGNOSIS — I50.21 SYSTOLIC CHF, ACUTE (HCC): Primary | ICD-10-CM

## 2025-07-31 DIAGNOSIS — E78.00 PURE HYPERCHOLESTEROLEMIA: ICD-10-CM

## 2025-07-31 DIAGNOSIS — E78.5 DYSLIPIDEMIA: ICD-10-CM

## 2025-07-31 DIAGNOSIS — I44.7 LBBB (LEFT BUNDLE BRANCH BLOCK): ICD-10-CM

## 2025-07-31 DIAGNOSIS — Z79.01 ANTICOAGULATION ADEQUATE: ICD-10-CM

## 2025-07-31 PROCEDURE — 99204 OFFICE O/P NEW MOD 45 MIN: CPT | Performed by: INTERNAL MEDICINE

## 2025-07-31 PROCEDURE — G8427 DOCREV CUR MEDS BY ELIG CLIN: HCPCS | Performed by: INTERNAL MEDICINE

## 2025-07-31 PROCEDURE — 1090F PRES/ABSN URINE INCON ASSESS: CPT | Performed by: INTERNAL MEDICINE

## 2025-07-31 PROCEDURE — G8400 PT W/DXA NO RESULTS DOC: HCPCS | Performed by: INTERNAL MEDICINE

## 2025-07-31 PROCEDURE — 1126F AMNT PAIN NOTED NONE PRSNT: CPT | Performed by: INTERNAL MEDICINE

## 2025-07-31 PROCEDURE — 1160F RVW MEDS BY RX/DR IN RCRD: CPT | Performed by: INTERNAL MEDICINE

## 2025-07-31 PROCEDURE — 1036F TOBACCO NON-USER: CPT | Performed by: INTERNAL MEDICINE

## 2025-07-31 PROCEDURE — 1123F ACP DISCUSS/DSCN MKR DOCD: CPT | Performed by: INTERNAL MEDICINE

## 2025-07-31 PROCEDURE — 1159F MED LIST DOCD IN RCRD: CPT | Performed by: INTERNAL MEDICINE

## 2025-07-31 PROCEDURE — G8420 CALC BMI NORM PARAMETERS: HCPCS | Performed by: INTERNAL MEDICINE

## 2025-07-31 PROCEDURE — G2211 COMPLEX E/M VISIT ADD ON: HCPCS | Performed by: INTERNAL MEDICINE

## 2025-07-31 PROCEDURE — 3079F DIAST BP 80-89 MM HG: CPT | Performed by: INTERNAL MEDICINE

## 2025-07-31 PROCEDURE — 3075F SYST BP GE 130 - 139MM HG: CPT | Performed by: INTERNAL MEDICINE

## 2025-07-31 PROCEDURE — 93005 ELECTROCARDIOGRAM TRACING: CPT | Performed by: INTERNAL MEDICINE

## 2025-07-31 PROCEDURE — 1111F DSCHRG MED/CURRENT MED MERGE: CPT | Performed by: INTERNAL MEDICINE

## 2025-07-31 PROCEDURE — 93010 ELECTROCARDIOGRAM REPORT: CPT | Performed by: INTERNAL MEDICINE

## 2025-07-31 RX ORDER — ALPRAZOLAM 0.5 MG
0.25 TABLET ORAL NIGHTLY
COMMUNITY

## 2025-07-31 ASSESSMENT — PATIENT HEALTH QUESTIONNAIRE - PHQ9
SUM OF ALL RESPONSES TO PHQ QUESTIONS 1-9: 0
SUM OF ALL RESPONSES TO PHQ QUESTIONS 1-9: 0
2. FEELING DOWN, DEPRESSED OR HOPELESS: NOT AT ALL
SUM OF ALL RESPONSES TO PHQ QUESTIONS 1-9: 0
1. LITTLE INTEREST OR PLEASURE IN DOING THINGS: NOT AT ALL
SUM OF ALL RESPONSES TO PHQ QUESTIONS 1-9: 0

## 2025-07-31 NOTE — PATIENT INSTRUCTIONS
Start Jardiance 10 mg daily    Obtain echocardiogram in 3 months   FU with Dr. Chacon after echocardiogram

## 2025-07-31 NOTE — PROGRESS NOTES
1. Have you been to the ER, urgent care clinic since your last visit?  Hospitalized since your last visit?No    2. Have you seen or consulted any other health care providers outside of the [facility] since your last visit?  Include any pap smears or colon screening. No

## 2025-08-11 DIAGNOSIS — F41.9 ANXIETY: Primary | ICD-10-CM

## 2025-08-12 RX ORDER — ALPRAZOLAM 0.5 MG
0.5 TABLET ORAL DAILY
Qty: 30 TABLET | Refills: 0 | Status: SHIPPED | OUTPATIENT
Start: 2025-08-12 | End: 2025-09-11

## 2025-08-29 ENCOUNTER — TELEPHONE (OUTPATIENT)
Age: 80
End: 2025-08-29

## (undated) PROCEDURE — 4A023N7 MEASUREMENT OF CARDIAC SAMPLING AND PRESSURE, LEFT HEART, PERCUTANEOUS APPROACH: ICD-10-PCS

## (undated) PROCEDURE — B2151ZZ FLUOROSCOPY OF LEFT HEART USING LOW OSMOLAR CONTRAST: ICD-10-PCS

## (undated) DEVICE — CATHETER DIAG 6FR L100CM JUDKINS L JL 5.0 DXTERITY

## (undated) DEVICE — HEART CATH-SFMC: Brand: MEDLINE INDUSTRIES, INC.

## (undated) DEVICE — PINNACLE PRECISION ACCESS SYSTEM INTRODUCER SHEATH: Brand: PINNACLE PRECISION ACCESS SYSTEM

## (undated) DEVICE — CATHETER KIT JL4 JR4 5FR 100CM 145 PGTL DXTERITY

## (undated) DEVICE — GLIDESHEATH SLENDER ACCESS KIT: Brand: GLIDESHEATH SLENDER

## (undated) DEVICE — MEDI-TRACE CADENCE ADULT, DEFIBRILLATION ELECTRODE -RTS  (10 PR/PK) - PHYSIO-CONTROL: Brand: MEDI-TRACE CADENCE

## (undated) DEVICE — Device